# Patient Record
Sex: FEMALE | Race: WHITE | HISPANIC OR LATINO | Employment: PART TIME | URBAN - METROPOLITAN AREA
[De-identification: names, ages, dates, MRNs, and addresses within clinical notes are randomized per-mention and may not be internally consistent; named-entity substitution may affect disease eponyms.]

---

## 2017-01-07 ENCOUNTER — HOSPITAL ENCOUNTER (EMERGENCY)
Facility: MEDICAL CENTER | Age: 27
End: 2017-01-07
Attending: EMERGENCY MEDICINE
Payer: MEDICAID

## 2017-01-07 VITALS
DIASTOLIC BLOOD PRESSURE: 80 MMHG | BODY MASS INDEX: 22.2 KG/M2 | SYSTOLIC BLOOD PRESSURE: 115 MMHG | TEMPERATURE: 97.7 F | RESPIRATION RATE: 15 BRPM | WEIGHT: 130 LBS | HEIGHT: 64 IN | HEART RATE: 87 BPM

## 2017-01-07 DIAGNOSIS — F32.A DEPRESSION, UNSPECIFIED DEPRESSION TYPE: ICD-10-CM

## 2017-01-07 LAB
AMPHET UR QL SCN: NEGATIVE
BARBITURATES UR QL SCN: NEGATIVE
BENZODIAZ UR QL SCN: NEGATIVE
BZE UR QL SCN: NEGATIVE
CANNABINOIDS UR QL SCN: POSITIVE
HCG UR QL: NEGATIVE
MDMA UR QL SCN: NEGATIVE
METHADONE UR QL SCN: NEGATIVE
OPIATES UR QL SCN: NEGATIVE
OXYCODONE UR QL SCN: NEGATIVE
PCP UR QL SCN: NEGATIVE
POC BREATHALIZER: 0 PERCENT (ref 0–0.01)
PROPOXYPH UR QL SCN: NEGATIVE
SP GR UR REFRACTOMETRY: 1.01

## 2017-01-07 PROCEDURE — 81025 URINE PREGNANCY TEST: CPT

## 2017-01-07 PROCEDURE — 302970 POC BREATHALIZER: Performed by: EMERGENCY MEDICINE

## 2017-01-07 PROCEDURE — 90791 PSYCH DIAGNOSTIC EVALUATION: CPT

## 2017-01-07 PROCEDURE — 80307 DRUG TEST PRSMV CHEM ANLYZR: CPT

## 2017-01-07 PROCEDURE — 99284 EMERGENCY DEPT VISIT MOD MDM: CPT

## 2017-01-07 ASSESSMENT — LIFESTYLE VARIABLES
HAVE PEOPLE ANNOYED YOU BY CRITICIZING YOUR DRINKING: NO
CONSUMPTION TOTAL: NEGATIVE
ON A TYPICAL DAY WHEN YOU DRINK ALCOHOL HOW MANY DRINKS DO YOU HAVE: 0
TOTAL SCORE: 0
DO YOU DRINK ALCOHOL: YES
TOTAL SCORE: 0
HOW MANY TIMES IN THE PAST YEAR HAVE YOU HAD 5 OR MORE DRINKS IN A DAY: 0
EVER FELT BAD OR GUILTY ABOUT YOUR DRINKING: NO
TOTAL SCORE: 0
AVERAGE NUMBER OF DAYS PER WEEK YOU HAVE A DRINK CONTAINING ALCOHOL: 0
HAVE YOU EVER FELT YOU SHOULD CUT DOWN ON YOUR DRINKING: NO
EVER HAD A DRINK FIRST THING IN THE MORNING TO STEADY YOUR NERVES TO GET RID OF A HANGOVER: NO

## 2017-01-07 ASSESSMENT — PAIN SCALES - GENERAL
PAINLEVEL_OUTOF10: 1
PAINLEVEL_OUTOF10: 1

## 2017-01-07 NOTE — ED NOTES
Notified by lab unable to complete urine HCG secondary to specific gravity to low, request blood sample. MD notified. [facility] currently bedside speaking with pt

## 2017-01-07 NOTE — ED AVS SNAPSHOT
1/7/2017          Joy Hanson  [address] [place] [address]    Dear Joy:    [facility] wants to ensure your discharge home is safe and you or your loved ones have had all your questions answered regarding your care after you leave the hospital.    You may receive a telephone call within two days of your discharge.  This call is to make certain you understand your discharge instructions as well as ensure we provided you with the best care possible during your stay with us.     The call will only last approximately 3-5 minutes and will be done by a nurse.    Once again, we want to ensure your discharge home is safe and that you have a clear understanding of any next steps in your care.  If you have any questions or concerns, please do not hesitate to contact us, we are here for you.  Thank you for choosing [facility] for your healthcare needs.    Sincerely,    Bo Jarrett    [facility]
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
## 2017-01-07 NOTE — ED NOTES
Patient ambulatory to discharge with steady gait NAD or deficits noted at time of discharge boyfriend in [place]

## 2017-01-07 NOTE — ED NOTES
Brought in by EMS Hx depression and Sz for cutting self denies SI,  Police into see pt during triage

## 2017-01-07 NOTE — ED AVS SNAPSHOT
[employer] Access Code: Activation code not generated  Current [employer] Status: Active    [employer]  A secure, online tool to manage your health information     "[employer]’s [employer]® is a secure, online tool that connects you to your personalized health information from the privacy of your home -- day or night - making it very easy for you to manage your healthcare. Once the activation process is completed, you can even access your medical information using the [employer] bella, which is available for free in the Apple Bella store or Google Play store.     [employer] provides the following levels of access (as shown below):   My Chart Features   [facility] Primary Care Doctor [facility]  Specialists [facility]  Urgent  Care Non-[facility]  Primary Care  Doctor   Email your healthcare team securely and privately 24/7 X X X X   Manage appointments: schedule your next appointment; view details of past/upcoming appointments X      Request prescription refills. X      View recent personal medical records, including lab and immunizations X X X X   View health record, including health history, allergies, medications X X X X   Read reports about your outpatient visits, procedures, consult and ER notes X X X X   See your discharge summary, which is a recap of your hospital and/or ER visit that includes your diagnosis, lab results, and care plan. X X       How to register for [employer]:  1. Go to  [email or web address].  2. Click on the Sign Up Now box, which takes you to the New Member Sign Up page. You will need to provide the following information:  a. Enter your [employer] Access Code exactly as it appears at the top of this page. (You will not need to use this code after you’ve completed the sign-up process. If you do not sign up before the expiration date, you must request a new code.)   b. Enter your date of birth.   c. Enter your home email address.   d. Click Submit, and follow the next screen’s instructions.  3. Create a [employer] ID. This will  be your [employer] login ID and cannot be changed, so think of one that is secure and easy to remember.  4. Create a [employer] password. You can change your password at any time.  5. Enter your Password Reset Question and Answer. This can be used at a later time if you forget your password.   6. Enter your e-mail address. This allows you to receive e-mail notifications when new information is available in [employer].  7. Click Sign Up. You can now view your health information.    For assistance activating your [employer] account, call [phone]

## 2017-01-07 NOTE — DISCHARGE INSTRUCTIONS
Depression, Adult  Depression is feeling sad, low, down in the dumps, blue, gloomy, or empty. In general, there are two kinds of depression:  · Normal sadness or grief. This can happen after something upsetting. It often goes away on its own within 2 weeks. After losing a loved one (bereavement), normal sadness and grief may last longer than two weeks. It usually gets better with time.  · Clinical depression. This kind lasts longer than normal sadness or grief. It keeps you from doing the things you normally do in life. It is often hard to function at home, work, or at school. It may affect your relationships with others. Treatment is often needed.  GET HELP RIGHT AWAY IF:  · You have thoughts about hurting yourself or others.  · You lose touch with reality (psychotic symptoms). You may:  ¨ See or hear things that are not real.  ¨ Have untrue beliefs about your life or people around you.  · Your medicine is giving you problems.  MAKE SURE YOU:  · Understand these instructions.  · Will watch your condition.  · Will get help right away if you are not doing well or get worse.     This information is not intended to replace advice given to you by your health care provider. Make sure you discuss any questions you have with your health care provider.     Document Released: 01/20/2012 Document Revised: 01/08/2016 Document Reviewed: 04/18/2013  [employer] Interactive Patient Education ©2016 [employer]

## 2017-01-07 NOTE — ED NOTES
Patient's mother in lobby to visit.  Explained that due to the reason for her visit, she is not allowed visitors.  Promised her that I would put her information in the computer to call her if anything changes.  Ramona [phone]

## 2017-01-07 NOTE — ED AVS SNAPSHOT
After Visit Summary                                                                                                                Joy Hanson   MRN: [id number]    Department:  [facility], Emergency Dept   Date of Visit:  1/7/2017            [facility], Emergency Dept    [address]    [facility] [address]    Phone:  [phone]      You were seen by     Marcel Cabrera M.D.      Your Diagnosis Was     Depression, unspecified depression type     F32.9       Follow-up Information     1. Follow up with Ella Jiménez M.D. In 1 week.    Specialty:  Family Medicine    Contact information    [address]  [address]  [facility] [address] [phone]        Medication Information     Review all of your home medications and newly ordered medications with your primary doctor and/or pharmacist as soon as possible. Follow medication instructions as directed by your doctor and/or pharmacist.     Please keep your complete medication list with you and share with your physician. Update the information when medications are discontinued, doses are changed, or new medications (including over-the-counter products) are added; and carry medication information at all times in the event of emergency situations.               Medication List      ASK your doctor about these medications        Instructions    lamotrigine 150 MG tablet   Commonly known as:  LAMICTAL    Take 150 mg by mouth 2 times a day.   Dose:  150 mg               Procedures and tests performed during your visit     BETA-HCG QUALITATIVE URINE    CLOSE OBSERVATION    [facility] consult    POC BREATHALIZER    REFRACTOMETER SG    URINE DRUG SCREEN (TRIAGE)        Discharge Instructions       Depression, Adult  Depression is feeling sad, low, down in the dumps, blue, gloomy, or empty. In general, there are two kinds of depression:  · Normal sadness or grief. This can happen after something upsetting. It often goes away on its  own within 2 weeks. After losing a loved one (bereavement), normal sadness and grief may last longer than two weeks. It usually gets better with time.  · Clinical depression. This kind lasts longer than normal sadness or grief. It keeps you from doing the things you normally do in life. It is often hard to function at home, work, or at school. It may affect your relationships with others. Treatment is often needed.  GET HELP RIGHT AWAY IF:  · You have thoughts about hurting yourself or others.  · You lose touch with reality (psychotic symptoms). You may:  ¨ See or hear things that are not real.  ¨ Have untrue beliefs about your life or people around you.  · Your medicine is giving you problems.  MAKE SURE YOU:  · Understand these instructions.  · Will watch your condition.  · Will get help right away if you are not doing well or get worse.     This information is not intended to replace advice given to you by your health care provider. Make sure you discuss any questions you have with your health care provider.     Document Released: 01/20/2012 Document Revised: 01/08/2016 Document Reviewed: 04/18/2013  [employer] Interactive Patient Education ©2016 [employer]            Patient Information     Patient Information    Following emergency treatment: all patient requiring follow-up care must return either to a private physician or a clinic if your condition worsens before you are able to obtain further medical attention, please return to the emergency room.     Billing Information    At [facility], we work to make the billing process streamlined for our patients.  Our Representatives are here to answer any questions you may have regarding your hospital bill.  If you have insurance coverage and have supplied your insurance information to us, we will submit a claim to your insurer on your behalf.  Should you have any questions regarding your bill, we can be reached online or by phone as follows:  Online: You are able  pay your bills online or live chat with our representatives about any billing questions you may have. We are here to help Monday - Friday from 8:00am to 7:30pm and 9:00am - 12:00pm on Saturdays.  Please visit [email or web address]  for more information.   Phone:  [phone] or [phone]    Please note that your emergency physician, surgeon, pathologist, radiologist, anesthesiologist, and other specialists are not employed by [facility] and will therefore bill separately for their services.  Please contact them directly for any questions concerning their bills at the numbers below:     Emergency Physician Services:  [phone]  [facility]:  [phone]  [facility]:  [phone]  [facility]:  [phone]    1. Your final bill may vary from the amount quoted upon discharge if all procedures are not complete at that time, or if your doctor has additional procedures of which we are not aware. You will receive an additional bill if you return to the Emergency Department at [facility] for suture removal regardless of the facility of which the sutures were placed.     2. Please arrange for settlement of this account at the emergency registration.    3. All self-pay accounts are due in full at the time of treatment.  If you are unable to meet this obligation then payment is expected within 4-5 days.     4. If you have had radiology studies (CT, X-ray, Ultrasound, MRI), you have received a preliminary result during your emergency department visit. Please contact the radiology department [phone] to receive a copy of your final result. Please discuss the Final result with your primary physician or with the follow up physician provided.     Crisis Hotline:  [place] Crisis Hotline:  5-680-XXHHXAV or 1-759.345.4701  [place] Crisis Hotline:    1-867.392.8357 or 632-562-3462         ED Discharge Follow Up Questions    1. In order to  provide you with very good care, we would like to follow up with a phone call in the next few days.  May we have your permission to contact you?     YES /  NO    2. What is the best phone number to call you? (       )_____-__________    3. What is the best time to call you?      Morning  /  Afternoon  /  Evening                   Patient Signature:  ____________________________________________________________    Date:  ____________________________________________________________
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
## 2017-06-07 RX ADMIN — ACETAMINOPHEN 650 MG: 325 TABLET, FILM COATED ORAL at 22:40

## 2017-06-08 ENCOUNTER — APPOINTMENT (OUTPATIENT)
Dept: RADIOLOGY | Facility: MEDICAL CENTER | Age: 27
End: 2017-06-08
Attending: EMERGENCY MEDICINE
Payer: MEDICAID

## 2017-06-08 ENCOUNTER — HOSPITAL ENCOUNTER (EMERGENCY)
Facility: MEDICAL CENTER | Age: 27
End: 2017-06-09
Attending: EMERGENCY MEDICINE
Payer: MEDICAID

## 2017-06-08 ENCOUNTER — TELEPHONE (OUTPATIENT)
Dept: MEDICAL GROUP | Facility: PHYSICIAN GROUP | Age: 27
End: 2017-06-08

## 2017-06-08 DIAGNOSIS — G40.909 SEIZURE DISORDER (HCC): ICD-10-CM

## 2017-06-08 DIAGNOSIS — R45.851 SUICIDAL IDEATION: ICD-10-CM

## 2017-06-08 DIAGNOSIS — Z91.148 NONCOMPLIANCE WITH MEDICATION REGIMEN: ICD-10-CM

## 2017-06-08 DIAGNOSIS — R42 DIZZINESS: ICD-10-CM

## 2017-06-08 DIAGNOSIS — F12.90 MARIJUANA USE: ICD-10-CM

## 2017-06-08 LAB
ALBUMIN SERPL BCP-MCNC: 4.6 G/DL (ref 3.2–4.9)
ALBUMIN/GLOB SERPL: 1.9 G/DL
ALP SERPL-CCNC: 42 U/L (ref 30–99)
ALT SERPL-CCNC: 14 U/L (ref 2–50)
AMPHET UR QL SCN: NEGATIVE
ANION GAP SERPL CALC-SCNC: 9 MMOL/L (ref 0–11.9)
APPEARANCE UR: CLEAR
APPEARANCE UR: CLEAR
AST SERPL-CCNC: 19 U/L (ref 12–45)
BARBITURATES UR QL SCN: NEGATIVE
BASOPHILS # BLD AUTO: 0.5 % (ref 0–1.8)
BASOPHILS # BLD: 0.05 K/UL (ref 0–0.12)
BENZODIAZ UR QL SCN: NEGATIVE
BILIRUB SERPL-MCNC: 0.4 MG/DL (ref 0.1–1.5)
BILIRUB UR QL STRIP.AUTO: NEGATIVE
BNP SERPL-MCNC: 3 PG/ML (ref 0–100)
BUN SERPL-MCNC: 10 MG/DL (ref 8–22)
BZE UR QL SCN: NEGATIVE
CALCIUM SERPL-MCNC: 9.4 MG/DL (ref 8.5–10.5)
CANNABINOIDS UR QL SCN: POSITIVE
CHLORIDE SERPL-SCNC: 105 MMOL/L (ref 96–112)
CO2 SERPL-SCNC: 22 MMOL/L (ref 20–33)
COLOR UR AUTO: YELLOW
COLOR UR: COLORLESS
CREAT SERPL-MCNC: 0.43 MG/DL (ref 0.5–1.4)
CULTURE IF INDICATED INDCX: NO UA CULTURE
EOSINOPHIL # BLD AUTO: 0.11 K/UL (ref 0–0.51)
EOSINOPHIL NFR BLD: 1.2 % (ref 0–6.9)
ERYTHROCYTE [DISTWIDTH] IN BLOOD BY AUTOMATED COUNT: 39.2 FL (ref 35.9–50)
GFR SERPL CREATININE-BSD FRML MDRD: >60 ML/MIN/1.73 M 2
GLOBULIN SER CALC-MCNC: 2.4 G/DL (ref 1.9–3.5)
GLUCOSE SERPL-MCNC: 93 MG/DL (ref 65–99)
GLUCOSE UR QL STRIP.AUTO: NEGATIVE MG/DL
GLUCOSE UR STRIP.AUTO-MCNC: NEGATIVE MG/DL
HCG UR QL: NEGATIVE
HCT VFR BLD AUTO: 43.4 % (ref 37–47)
HGB BLD-MCNC: 14.8 G/DL (ref 12–16)
IMM GRANULOCYTES # BLD AUTO: 0.05 K/UL (ref 0–0.11)
IMM GRANULOCYTES NFR BLD AUTO: 0.5 % (ref 0–0.9)
KETONES UR QL STRIP.AUTO: NEGATIVE MG/DL
KETONES UR STRIP.AUTO-MCNC: NEGATIVE MG/DL
LACTATE BLD-SCNC: 1.3 MMOL/L (ref 0.5–2)
LEUKOCYTE ESTERASE UR QL STRIP.AUTO: NEGATIVE
LEUKOCYTE ESTERASE UR QL STRIP.AUTO: NEGATIVE
LIPASE SERPL-CCNC: 20 U/L (ref 11–82)
LYMPHOCYTES # BLD AUTO: 1.71 K/UL (ref 1–4.8)
LYMPHOCYTES NFR BLD: 18.6 % (ref 22–41)
MCH RBC QN AUTO: 28.9 PG (ref 27–33)
MCHC RBC AUTO-ENTMCNC: 34.1 G/DL (ref 33.6–35)
MCV RBC AUTO: 84.8 FL (ref 81.4–97.8)
MDMA UR QL SCN: NEGATIVE
METHADONE UR QL SCN: NEGATIVE
MICRO URNS: NORMAL
MONOCYTES # BLD AUTO: 0.59 K/UL (ref 0–0.85)
MONOCYTES NFR BLD AUTO: 6.4 % (ref 0–13.4)
NEUTROPHILS # BLD AUTO: 6.68 K/UL (ref 2–7.15)
NEUTROPHILS NFR BLD: 72.8 % (ref 44–72)
NITRITE UR QL STRIP.AUTO: NEGATIVE
NITRITE UR QL STRIP.AUTO: NEGATIVE
NRBC # BLD AUTO: 0 K/UL
NRBC BLD AUTO-RTO: 0 /100 WBC
OPIATES UR QL SCN: NEGATIVE
OXYCODONE UR QL SCN: NEGATIVE
PCP UR QL SCN: NEGATIVE
PH UR STRIP.AUTO: 7.5 [PH]
PH UR STRIP.AUTO: 8 [PH]
PLATELET # BLD AUTO: 206 K/UL (ref 164–446)
PMV BLD AUTO: 10.6 FL (ref 9–12.9)
POC BREATHALIZER: 0 PERCENT (ref 0–0.01)
POTASSIUM SERPL-SCNC: 3.5 MMOL/L (ref 3.6–5.5)
PROPOXYPH UR QL SCN: NEGATIVE
PROT SERPL-MCNC: 7 G/DL (ref 6–8.2)
PROT UR QL STRIP: NEGATIVE MG/DL
PROT UR QL STRIP: NEGATIVE MG/DL
RBC # BLD AUTO: 5.12 M/UL (ref 4.2–5.4)
RBC UR QL AUTO: ABNORMAL
RBC UR QL AUTO: NEGATIVE
SODIUM SERPL-SCNC: 136 MMOL/L (ref 135–145)
SP GR UR STRIP.AUTO: 1.01
SP GR UR: 1.02
TROPONIN I SERPL-MCNC: <0.01 NG/ML (ref 0–0.04)
WBC # BLD AUTO: 9.2 K/UL (ref 4.8–10.8)

## 2017-06-08 PROCEDURE — 302970 POC BREATHALIZER: Performed by: EMERGENCY MEDICINE

## 2017-06-08 PROCEDURE — 80053 COMPREHEN METABOLIC PANEL: CPT

## 2017-06-08 PROCEDURE — 700105 HCHG RX REV CODE 258: Performed by: EMERGENCY MEDICINE

## 2017-06-08 PROCEDURE — 90791 PSYCH DIAGNOSTIC EVALUATION: CPT

## 2017-06-08 PROCEDURE — 36415 COLL VENOUS BLD VENIPUNCTURE: CPT

## 2017-06-08 PROCEDURE — 85025 COMPLETE CBC W/AUTO DIFF WBC: CPT

## 2017-06-08 PROCEDURE — 81002 URINALYSIS NONAUTO W/O SCOPE: CPT | Mod: 59

## 2017-06-08 PROCEDURE — 84484 ASSAY OF TROPONIN QUANT: CPT

## 2017-06-08 PROCEDURE — 700102 HCHG RX REV CODE 250 W/ 637 OVERRIDE(OP): Performed by: EMERGENCY MEDICINE

## 2017-06-08 PROCEDURE — 80307 DRUG TEST PRSMV CHEM ANLYZR: CPT

## 2017-06-08 PROCEDURE — 81025 URINE PREGNANCY TEST: CPT

## 2017-06-08 PROCEDURE — 71010 DX-CHEST-PORTABLE (1 VIEW): CPT

## 2017-06-08 PROCEDURE — 81003 URINALYSIS AUTO W/O SCOPE: CPT | Mod: 59

## 2017-06-08 PROCEDURE — 83605 ASSAY OF LACTIC ACID: CPT

## 2017-06-08 PROCEDURE — 93005 ELECTROCARDIOGRAM TRACING: CPT | Performed by: EMERGENCY MEDICINE

## 2017-06-08 PROCEDURE — 83880 ASSAY OF NATRIURETIC PEPTIDE: CPT

## 2017-06-08 PROCEDURE — 96361 HYDRATE IV INFUSION ADD-ON: CPT

## 2017-06-08 PROCEDURE — 99285 EMERGENCY DEPT VISIT HI MDM: CPT

## 2017-06-08 PROCEDURE — 83690 ASSAY OF LIPASE: CPT

## 2017-06-08 PROCEDURE — 70450 CT HEAD/BRAIN W/O DYE: CPT

## 2017-06-08 PROCEDURE — 96360 HYDRATION IV INFUSION INIT: CPT

## 2017-06-08 PROCEDURE — A9270 NON-COVERED ITEM OR SERVICE: HCPCS | Performed by: EMERGENCY MEDICINE

## 2017-06-08 RX ORDER — SODIUM CHLORIDE 9 MG/ML
1000 INJECTION, SOLUTION INTRAVENOUS ONCE
Status: COMPLETED | OUTPATIENT
Start: 2017-06-08 | End: 2017-06-08

## 2017-06-08 RX ORDER — ACETAMINOPHEN 325 MG/1
650 TABLET ORAL ONCE
Status: COMPLETED | OUTPATIENT
Start: 2017-06-08 | End: 2017-06-07

## 2017-06-08 RX ORDER — LAMOTRIGINE 100 MG/1
150 TABLET ORAL ONCE
Status: DISCONTINUED | OUTPATIENT
Start: 2017-06-08 | End: 2017-06-09

## 2017-06-08 RX ORDER — MECLIZINE HYDROCHLORIDE 25 MG/1
25 TABLET ORAL ONCE
Status: COMPLETED | OUTPATIENT
Start: 2017-06-08 | End: 2017-06-08

## 2017-06-08 RX ORDER — LEVETIRACETAM 1000 MG/1
500 TABLET ORAL 2 TIMES DAILY
Qty: 60 TAB | Refills: 0 | Status: SHIPPED | OUTPATIENT
Start: 2017-06-08 | End: 2019-02-12 | Stop reason: CLARIF

## 2017-06-08 RX ORDER — LEVETIRACETAM 500 MG/1
1000 TABLET ORAL ONCE
Status: COMPLETED | OUTPATIENT
Start: 2017-06-08 | End: 2017-06-08

## 2017-06-08 RX ADMIN — LEVETIRACETAM 1000 MG: 500 TABLET, FILM COATED ORAL at 17:55

## 2017-06-08 RX ADMIN — MECLIZINE HYDROCHLORIDE 25 MG: 25 TABLET ORAL at 15:08

## 2017-06-08 RX ADMIN — SODIUM CHLORIDE 1000 ML: 9 INJECTION, SOLUTION INTRAVENOUS at 11:26

## 2017-06-08 ASSESSMENT — PAIN SCALES - GENERAL: PAINLEVEL_OUTOF10: 7

## 2017-06-08 ASSESSMENT — ENCOUNTER SYMPTOMS
ABDOMINAL PAIN: 0
BACK PAIN: 1
BLURRED VISION: 1
NECK PAIN: 1
FALLS: 1
MYALGIAS: 1
SEIZURES: 1
WEAKNESS: 1
DIZZINESS: 1
FEVER: 0
DEPRESSION: 1

## 2017-06-08 ASSESSMENT — LIFESTYLE VARIABLES: DO YOU DRINK ALCOHOL: NO

## 2017-06-08 NOTE — ED NOTES
Pt requesting to eat. Pt offered sandwich box, but refused stating her mom would go get her some soup. Pt informed we are waiting on [facility] to come see her for evaluation.

## 2017-06-08 NOTE — ED AVS SNAPSHOT
R[employer] Technology Access Code: Activation code not generated  Current Rive Technology Status: Active    [email or web address]  A secure, online tool to manage your health information     [employer]’s [employer] Technology® is a secure, online tool that connects you to your personalized health information from the privacy of your home -- day or night - making it very easy for you to manage your healthcare. Once the activation process is completed, you can even access your medical information using the R[employer] Technology bella, which is available for free in the Apple Bella store or Google Play store.     R[employer] Technology provides the following levels of access (as shown below):   My Chart Features   [facility] Primary Care Doctor [facility]  Specialists [facility]  Urgent  Care Non-[facility]  Primary Care  Doctor   Email your healthcare team securely and privately 24/7 X X X X   Manage appointments: schedule your next appointment; view details of past/upcoming appointments X      Request prescription refills. X      View recent personal medical records, including lab and immunizations X X X X   View health record, including health history, allergies, medications X X X X   Read reports about your outpatient visits, procedures, consult and ER notes X X X X   See your discharge summary, which is a recap of your hospital and/or ER visit that includes your diagnosis, lab results, and care plan. X X       How to register for R[employer] Technology:  1. Go to  [email or web address].  2. Click on the Sign Up Now box, which takes you to the New Member Sign Up page. You will need to provide the following information:  a. Enter your Rive Technology Access Code exactly as it appears at the top of this page. (You will not need to use this code after you’ve completed the sign-up process. If you do not sign up before the expiration date, you must request a new code.)   b. Enter your date of birth.   c. Enter your home email address.   d. Click Submit, and follow the next screen’s instructions.  3. Create a Rive Technology ID. This will  be your [employer] login ID and cannot be changed, so think of one that is secure and easy to remember.  4. Create a [employer] password. You can change your password at any time.  5. Enter your Password Reset Question and Answer. This can be used at a later time if you forget your password.   6. Enter your e-mail address. This allows you to receive e-mail notifications when new information is available in [employer].  7. Click Sign Up. You can now view your health information.    For assistance activating your [employer] account, call [phone]

## 2017-06-08 NOTE — ED NOTES
Assumed care of pt. Pt resting on gurney on monitor with family at bedside. Pt states she feels dizzy and has neck pain. Call light by pt.

## 2017-06-08 NOTE — ED NOTES
"MD notified that pt refused Lamictal stating, \"I took it on Saturday and it made me feel more depressed and dizzy.\"  "

## 2017-06-08 NOTE — ED NOTES
"Joy Hanson 26 y.o. female   Chief Complaint   Patient presents with   • Near Syncopal     Hx Sz disorder, stopped taking Sz meds 3 mos ago (researching diet/natural methods).  Sz 1.5 weeks ago and has been feeling as though sh's going to pass out since then.   • Dizziness     Severe. \"Bumping into things.\"  Fall x2 this week, hit head.  No midline cervical pain with palpation.       Pt returned to [facility] and educated on triage process.  Advised to notify RN with changes or concerns.    "

## 2017-06-08 NOTE — ED AVS SNAPSHOT
Home Care Instructions                                                                                                                Joy Hanson   MRN: [id number]    Department:  [facility], Emergency Dept   Date of Visit:  6/8/2017            [facility], Emergency Dept    [address] [address]    Phone:  [phone]      You were seen by     1. Houston Thomas M.D.    2. Abiodun Simeon M.D.    3. Darwin Heller M.D.    4. Win Tolentino M.D.      Your Diagnosis Was     Seizure disorder (CMS-[facility])     G40.909       These are the medications you received during your hospitalization from 06/08/2017 1029 to 06/09/2017 1215     Date/Time Order Dose Route Action    06/08/2017 1126 NS infusion 1,000 mL 1,000 mL Intravenous New Bag    06/08/2017 1515 lamotrigine (LAMICTAL) tablet 150 mg 150 mg Oral Refused    06/08/2017 1508 meclizine (ANTIVERT) tablet 25 mg 25 mg Oral Given    06/08/2017 1755 levetiracetam (KEPPRA) tablet 1,000 mg 1,000 mg Oral Given    06/09/2017 1057 levetiracetam (KEPPRA) tablet 500 mg 500 mg Oral Given      Follow-up Information     1. Follow up with Your Physician. Schedule an appointment as soon as possible for a visit in 2 days.    Specialty:  Emergency Medicine    Contact information    Varies          2. Follow up with Galileo Edwards M.D..    Specialty:  Neurology    Why:  or partner    Contact information    [address] [address] [phone]        Medication Information     Review all of your home medications and newly ordered medications with your primary doctor and/or pharmacist as soon as possible. Follow medication instructions as directed by your doctor and/or pharmacist.     Please keep your complete medication list with you and share with your physician. Update the information when medications are discontinued, doses are changed, or new medications (including over-the-counter products) are  added; and carry medication information at all times in the event of emergency situations.               Medication List      START taking these medications        Instructions    Morning Afternoon Evening Bedtime    levetiracetam 1000 MG tablet   Last time this was given:  500 mg on 6/9/2017 10:57 AM   Commonly known as:  KEPPRA        Take 0.5 Tabs by mouth 2 times a day.   Dose:  500 mg                             Where to Get Your Medications      You can get these medications from any pharmacy     Bring a paper prescription for each of these medications    - levetiracetam 1000 MG tablet            Procedures and tests performed during your visit     BTYPE NATRIURETIC PEPTIDE    CARDIAC MONITORING    CBC WITH DIFFERENTIAL    COMP METABOLIC PANEL    CT-HEAD W/O    DX-CHEST-PORTABLE (1 VIEW)    EKG (NOW)    ESTIMATED GFR    IP CONSULT TO     IV SALINE LOCK    LACTIC ACID    LIPASE    NURSING COMMUNICATION    POC BREATHALIZER    POC UA    POC URINE PREGNANCY    TROPONIN    URINALYSIS CULTURE, IF INDICATED    URINE DRUG SCREEN        Discharge Instructions       Follow-up with your doctor.  Return to the ER for any new medical problems or complaints.  For now, I recommend you see your neurologist in [place].  Going forward, if you want a neurologist in [place], call to make appt with [facility].    Seizure, Adult  A seizure is abnormal electrical activity in the brain. Seizures usually last from 30 seconds to 2 minutes. There are various types of seizures.  Before a seizure, you may have a warning sensation (aura) that a seizure is about to occur. An aura may include the following symptoms:   · Fear or anxiety.  · Nausea.  · Feeling like the room is spinning (vertigo).  · Vision changes, such as seeing flashing lights or spots.  Common symptoms during a seizure include:  · A change in attention or behavior (altered mental status).  · Convulsions with rhythmic jerking movements.  · Drooling.  · Rapid  eye movements.  · Grunting.  · Loss of bladder and bowel control.  · Bitter taste in the mouth.  · Tongue biting.  After a seizure, you may feel confused and sleepy. You may also have an injury resulting from convulsions during the seizure.  HOME CARE INSTRUCTIONS   · If you are given medicines, take them exactly as prescribed by your health care provider.  · Keep all follow-up appointments as directed by your health care provider.  · Do not swim or drive or engage in risky activity during which a seizure could cause further injury to you or others until your health care provider says it is OK.  · Get adequate rest.  · Teach friends and family what to do if you have a seizure. They should:  ¨ Lay you on the ground to prevent a fall.  ¨ Put a cushion under your head.  ¨ Loosen any tight clothing around your neck.  ¨ Turn you on your side. If vomiting occurs, this helps keep your airway clear.  ¨ Stay with you until you recover.  ¨ Know whether or not you need emergency care.  SEEK IMMEDIATE MEDICAL CARE IF:  · The seizure lasts longer than 5 minutes.  · The seizure is severe or you do not wake up immediately after the seizure.  · You have an altered mental status after the seizure.  · You are having more frequent or worsening seizures.  Someone should drive you to the emergency department or call local emergency services (911 in [place]  MAKE SURE YOU:  · Understand these instructions.  · Will watch your condition.  · Will get help right away if you are not doing well or get worse.     This information is not intended to replace advice given to you by your health care provider. Make sure you discuss any questions you have with your health care provider.     Document Released: 12/15/2001 Document Revised: 01/08/2016 Document Reviewed: 07/30/2014  Elsevier Interactive Patient Education ©2016 [employer]            Patient Information     Patient Information    Following emergency treatment: all patient requiring  follow-up care must return either to a private physician or a clinic if your condition worsens before you are able to obtain further medical attention, please return to the emergency room.     Billing Information    At [facility], we work to make the billing process streamlined for our patients.  Our Representatives are here to answer any questions you may have regarding your hospital bill.  If you have insurance coverage and have supplied your insurance information to us, we will submit a claim to your insurer on your behalf.  Should you have any questions regarding your bill, we can be reached online or by phone as follows:  Online: You are able pay your bills online or live chat with our representatives about any billing questions you may have. We are here to help Monday - Friday from 8:00am to 7:30pm and 9:00am - 12:00pm on Saturdays.  Please visit [email or web address]  for more information.   Phone:  [phone] or [phone]    Please note that your emergency physician, surgeon, pathologist, radiologist, anesthesiologist, and other specialists are not employed by [facility] and will therefore bill separately for their services.  Please contact them directly for any questions concerning their bills at the numbers below:     Emergency Physician Services:  [phone]  [facility]:  [phone]  [facility]:  [phone]  [facility]:  [phone]    1. Your final bill may vary from the amount quoted upon discharge if all procedures are not complete at that time, or if your doctor has additional procedures of which we are not aware. You will receive an additional bill if you return to the Emergency Department at [facility] for suture removal regardless of the facility of which the sutures were placed.     2. Please arrange for settlement of this account at the emergency registration.    3. All self-pay accounts are due in  full at the time of treatment.  If you are unable to meet this obligation then payment is expected within 4-5 days.     4. If you have had radiology studies (CT, X-ray, Ultrasound, MRI), you have received a preliminary result during your emergency department visit. Please contact the radiology department [phone] to receive a copy of your final result. Please discuss the Final result with your primary physician or with the follow up physician provided.     Crisis Hotline:  [place] Crisis Hotline:  1-738-CJKZSHZ or 1-253.562.9417  [place] Crisis Hotline:    1-376.408.6252 or 601-255-3175         ED Discharge Follow Up Questions    1. In order to provide you with very good care, we would like to follow up with a phone call in the next few days.  May we have your permission to contact you?     YES /  NO    2. What is the best phone number to call you? (       )_____-__________    3. What is the best time to call you?      Morning  /  Afternoon  /  Evening                   Patient Signature:  ____________________________________________________________    Date:  ____________________________________________________________

## 2017-06-08 NOTE — ED AVS SNAPSHOT
6/9/2017    Joy Hanson  [address] [place] [address]    Dear Joy:    [facility] wants to ensure your discharge home is safe and you or your loved ones have had all of your questions answered regarding your care after you leave the hospital.    Below is a list of resources and contact information should you have any questions regarding your hospital stay, follow-up instructions, or active medical symptoms.    Questions or Concerns Regarding… Contact   Medical Questions Related to Your Discharge  (7 days a week, 8am-5pm) Contact a Nurse Care Coordinator   [phone]   Medical Questions Not Related to Your Discharge  (24 hours a day / 7 days a week)  Contact the Nurse Health Line   [phone]    Medications or Discharge Instructions Refer to your discharge packet   or contact your [facility] Primary Care Provider   [phone]   Follow-up Appointment(s) Schedule your appointment via [employer]   or contact Scheduling [phone]   Billing Review your statement via [employer]  or contact Billing [phone]   Medical Records Review your records via [employer]   or contact Medical Records [phone]     You may receive a telephone call within two days of discharge. This call is to make certain you understand your discharge instructions and have the opportunity to have any questions answered. You can also easily access your medical information, test results and upcoming appointments via the [employer] free online health management tool. You can learn more and sign up at [employer]. For assistance setting up your [employer] account, please call [phone].    Once again, we want to ensure your discharge home is safe and that you have a clear understanding of any next steps in your care. If you have any questions or concerns, please do not hesitate to contact us, we are here for you. Thank you for choosing [facility] for your healthcare needs.    Sincerely,    Your [facility] Healthcare Team

## 2017-06-08 NOTE — ED PROVIDER NOTES
"ED Provider Note    Scribed for Dr. Houston Thomas M.D. by Horace Mccray. 6/8/2017, 10:45 AM.    Primary care provider: Ella Jiménez M.D.  Means of arrival: Ambulance  History obtained from: Patient  History limited by: None    CHIEF COMPLAINT  Chief Complaint   Patient presents with   • Near Syncopal     Hx Sz disorder, stopped taking Sz meds 3 mos ago (researching diet/natural methods).  Sz 1.5 weeks ago and has been feeling as though sh's going to pass out since then.   • Dizziness     Severe. \"Bumping into things.\"  Fall x2 this week, hit head.  No midline cervical pain with palpation.        HPI  Joy Hanson is a 26 y.o. female who presents to the Emergency Department brought in by ambulance due to \"intense\" myalgias. Per patient, she had a seizure last Monday and woke up on the floor with a laceration to her lip. She reports hitting her head at that time and obtained a bump to her head. The patient states that she still has a bump to her head, as well as continued head pain. She also has neck pain radiating to her back. Her back pain is diffuse and worse in the middle. She states that her back feels \"tense\". She has had associated symptoms of blurred vision, dizziness, and weakness. The patient reports that she went to the bathroom today and could barely get off the toilet secondary to feeling weak and dizzy.  Per patient, she has been \"bumping into things\" in her house since she feels \"disoriented and unstable\". She also states that she has been shaking all week and \"unable to control [her] body. However, the patient has not had any falls this week after her seizure. She also reports \"hot flashes\" but denies fever, difficulties urinating, or abdominal pain. The patient has a history of seizures and reports taking Lamictal Saturday. However, it did not improve her symptoms and it made her depressed and suicidal. Per patient, she stopped taking Lamictal since she was denied Medicaid for 3 to 4 months. " "Denies taking any other medications. She tried changing her diet, doing yoga, and doing aromatherapy, but this did not help her symptoms. The patient has a history of depression and reports previous suicidal attempts. She normally smokes marijuana to help with her depression and appetite. However, marijuana has made her feel more dizzy this week. Denies suicidal ideation currently. The patient denies a chance of being pregnant. Her neurologist is Dr. Soler.      REVIEW OF SYSTEMS  Review of Systems   Constitutional: Negative for fever.        Positive for shaking and \"hot flashes\".    HENT:        Positive for head pain/ bump to head.    Eyes: Positive for blurred vision.   Gastrointestinal: Negative for abdominal pain.   Genitourinary:        Negative for difficulties urinating.    Musculoskeletal: Positive for myalgias, back pain (diffuse, worst in middle. ), falls (No falls since seizure) and neck pain.   Neurological: Positive for dizziness, seizures and weakness.        Positive for feeling \"disoriented and unstable\". Positive for being \"unable to control body\".    Psychiatric/Behavioral: Positive for depression and suicidal ideas (resolved).   All other systems reviewed and are negative.  C    PAST MEDICAL HISTORY   has a past medical history of Seizure disorder (CMS-HCC); Seizure (CMS-HCC); and Psychiatric disorder (depression).    SURGICAL HISTORY  No surgical history noted    SOCIAL HISTORY  Social History   Substance Use Topics   • Smoking status: Never Smoker    • Alcohol Use: Yes      History   Drug Use No     Comment: thc       FAMILY HISTORY  No family history noted    CURRENT MEDICATIONS  Home Medications     Reviewed by Daija Salcedo R.N. (Registered Nurse) on 06/08/17 at 1040  Med List Status: Complete    Medication Last Dose Status          Patient [place] Taking any Medications                        ALLERGIES  No Known Allergies    PHYSICAL EXAM  VITAL SIGNS: /63 mmHg  Pulse 72  " "Temp(Src) 36.2 °C (97.2 °F)  Resp 16  Ht 1.626 m (5' 4.02\")  Wt 58.968 kg (130 lb)  BMI 22.30 kg/m2  SpO2 99%  LMP 05/24/2017 (Exact Date)  Vitals reviewed.  Constitutional: Well developed, Well nourished, no acute distress, Non-toxic appearance.   HENT: Normocephalic, Atraumatic, Bilateral external ears normal, Oropharynx moist, No oral exudates, Nose normal. TM are normal  Eyes: PERRL, EOMI, Conjunctiva normal, No discharge.   Neck: Normal range of motion,.   Cardiovascular: Normal heart rate, Normal rhythm, No murmurs, No rubs, No gallops.   Thorax & Lungs: Normal breath sounds, No respiratory distress, No wheezing,   Abdomen: Bowel sounds normal, Soft, No tenderness  Skin: Warm, Dry, No erythema, No rash.   Back: No focal tenderness, No CVA tenderness.   Musculoskeletal: Good range of motion in all major joints.   Neurologic: Alert, Normal motor function, Normal sensory function, No focal deficits noted.   Psychiatric: Anxious , depressed, states she is suicidal.    LABS  Results for orders placed or performed during the hospital encounter of 06/08/17   CBC WITH DIFFERENTIAL   Result Value Ref Range    WBC 9.2 4.8 - 10.8 K/uL    RBC 5.12 4.20 - 5.40 M/uL    Hemoglobin 14.8 12.0 - 16.0 g/dL    Hematocrit 43.4 37.0 - 47.0 %    MCV 84.8 81.4 - 97.8 fL    MCH 28.9 27.0 - 33.0 pg    MCHC 34.1 33.6 - 35.0 g/dL    RDW 39.2 35.9 - 50.0 fL    Platelet Count 206 164 - 446 K/uL    MPV 10.6 9.0 - 12.9 fL    Neutrophils-Polys 72.80 (H) 44.00 - 72.00 %    Lymphocytes 18.60 (L) 22.00 - 41.00 %    Monocytes 6.40 0.00 - 13.40 %    Eosinophils 1.20 0.00 - 6.90 %    Basophils 0.50 0.00 - 1.80 %    Immature Granulocytes 0.50 0.00 - 0.90 %    Nucleated RBC 0.00 /100 WBC    Neutrophils (Absolute) 6.68 2.00 - 7.15 K/uL    Lymphs (Absolute) 1.71 1.00 - 4.80 K/uL    Monos (Absolute) 0.59 0.00 - 0.85 K/uL    Eos (Absolute) 0.11 0.00 - 0.51 K/uL    Baso (Absolute) 0.05 0.00 - 0.12 K/uL    Immature Granulocytes (abs) 0.05 0.00 - 0.11 " K/uL    NRBC (Absolute) 0.00 K/uL   COMP METABOLIC PANEL   Result Value Ref Range    Sodium 136 135 - 145 mmol/L    Potassium 3.5 (L) 3.6 - 5.5 mmol/L    Chloride 105 96 - 112 mmol/L    Co2 22 20 - 33 mmol/L    Anion Gap 9.0 0.0 - 11.9    Glucose 93 65 - 99 mg/dL    Bun 10 8 - 22 mg/dL    Creatinine 0.43 (L) 0.50 - 1.40 mg/dL    Calcium 9.4 8.5 - 10.5 mg/dL    AST(SGOT) 19 12 - 45 U/L    ALT(SGPT) 14 2 - 50 U/L    Alkaline Phosphatase 42 30 - 99 U/L    Total Bilirubin 0.4 0.1 - 1.5 mg/dL    Albumin 4.6 3.2 - 4.9 g/dL    Total Protein 7.0 6.0 - 8.2 g/dL    Globulin 2.4 1.9 - 3.5 g/dL    A-G Ratio 1.9 g/dL   LIPASE   Result Value Ref Range    Lipase 20 11 - 82 U/L   TROPONIN   Result Value Ref Range    Troponin I <0.01 0.00 - 0.04 ng/mL   BTYPE NATRIURETIC PEPTIDE   Result Value Ref Range    B Natriuretic Peptide 3 0 - 100 pg/mL   LACTIC ACID   Result Value Ref Range    Lactic Acid 1.3 0.5 - 2.0 mmol/L   ESTIMATED GFR   Result Value Ref Range    GFR If African American >60 >60 mL/min/1.73 m 2    GFR If Non African American >60 >60 mL/min/1.73 m 2   POC UA   Result Value Ref Range    POC Color Yellow     POC Appearance Clear     POC Glucose Negative Negative mg/dL    POC Ketones Negative Negative mg/dL    POC Specific Gravity 1.020 1.005-1.030    POC Blood Trace-intact (A) Negative    POC Urine PH 7.5 5.0-8.0    POC Protein Negative Negative mg/dL    POC Nitrites Negative Negative    POC Leukocyte Esterase Negative Negative   POC URINE PREGNANCY   Result Value Ref Range    POC Urine Pregnancy Test Negative Negative   EKG (NOW)   Result Value Ref Range    Report       [facility] Emergency Dept.    Test Date:  2017  Pt Name:    ANAYA CELESTE               Department: ER  MRN:        [id number]                      Room:       [facility]  Gender:     F                            Technician: [id number]  :        1990                   Requested By:AUSTIN COELLO  Order #:    [id number]                 "    Reading MD:    Measurements  Intervals                                Axis  Rate:       61                           P:          47  LA:         136                          QRS:        63  QRSD:       72                           T:          42  QT:         408  QTc:        411    Interpretive Statements  SINUS RHYTHM  No previous ECG available for comparison       All labs reviewed by me.    EKG Interpretation  Interpreted by me    Rhythm:  Normal sinus rhythm   Rate: 61  Axis: normal  Ectopy: none  Conduction: normal  ST Segments: no acute change  T Waves: no acute change  Q Waves: none  Clinical Impression: Normal EKG without acute changes      RADIOLOGY  CT-HEAD W/O   Final Result      No acute intracranial abnormality is identified.      DX-CHEST-PORTABLE (1 VIEW)   Final Result      No acute cardiac or pulmonary abnormalities are identified.        The radiologist's interpretation of all radiological studies have been reviewed by me.    COURSE & MEDICAL DECISION MAKING  Pertinent Labs & Imaging studies reviewed. (See chart for details)    Obtained and reviewed past medical records from previous visits for seizures and noncompliance.    10:45 AM Patient seen and examined at bedside. The patient presents with *depression and anxiety as well as seizures, dizziness., and the differential diagnosis includes but is not limited to seizure, electrolyte abnormality, dehydration, and anxiety. Ordered for EKG, estimated GFR, BNP, troponin, lipase, CMP, CBC with differential, POC urine pregnancy, POC urinalysis, urinalysis culture, lactic acid, CT head, and chest x-ray to evaluate. Patient will be treated with NS infusion 1,000 mL IV for rehydration.     11:27 AM- Ordered POC Urine pregnancy and POC UA to further evaluate symptoms.     12:25 PM- Reviewed labs and radiological studies, which are overall unremarkable.     3:48 PM- Per nurse, the patient is refusing Lamictal, stating that she \"took it on Saturday and it " "made [her] feel more depressed and dizzy\".     4:21 PM- [facility] nurse will evaluate patient.     5:01 PM- Per [facility] nurse, the patient is feeling suicidal and will be put on a hol    5:06 PM- Paged Dr. Soler (Neurology).     The patient presents here with multiple complaints.  She is depressed, tearful, states she is suicidal and depressed.  Once medically cleared.  Otherwise, he'll see her.    She is noncompliant.  She complains of dizziness, feeling unsteady, falling down, having seizures and hitting her head.  Head CT and metabolic workup and significant workup performed and are all negative.  She complains of persistent dizziness which is a normal neurologic exam and a normal stable gait.  She walks on the ER.  I further through different maneuvers to try to reproduce her vertigo and this was unsuccessful.  The patient does not have reproducible Vertigo.  She has a normal cerebellar exam.  I doubt this is a posterior cerebellar stroke.  I don't think she requires an MRI.  She was of unsteadiness on her feet, but she looks well and she walks without any difficulty.    She is noncompliant with her seizure medications because she states it makes her feel more depressed and suicidal.  This is not likely to be the case.  However, the patient is counseled to take seizure medications.  She agreed to take Keppra again.  Did not want IV Keppra so we will start  her back on oral Keppra.      At this point, I think she'll be transferred for inpatient psychiatric care.  She was only able hold.  She is currently stable.    I'll write a prescription for Her.  She is to follow-up with her doctor.  She is to return to the ER for any new medical problems or complaints.  Counseled to take her medications.  And to stop doing drugs.    I did speak with Dr. Goins, who recommended starting Keppra.  She'll not take Lamictal.  I was unable to get ahold of the patient's neurologist.    DISPOSITION:  Patient will be discharged " home in stable condition.    FOLLOW UP:  No follow-up provider specified.    OUTPATIENT MEDICATIONS:  New Prescriptions    No medications on file         FINAL IMPRESSION  1. Seizure disorder (HCC)    2. Noncompliance with medication regimen    3. Marijuana use    4. Dizziness    5. Suicidal ideation          Horace MANCILLA (Scribe), am scribing for, and in the presence of, Houston Thomas M.D..    Electronically signed by: Horace Mccray (Scribe), 6/8/2017    IHouston M.D. personally performed the services described in this documentation, as scribed by Horace Mccray in my presence, and it is both accurate and complete.    The note accurately reflects work and decisions made by me.  Houston Thomas  6/8/2017  6:52 PM

## 2017-06-09 VITALS
RESPIRATION RATE: 16 BRPM | BODY MASS INDEX: 22.2 KG/M2 | HEART RATE: 78 BPM | SYSTOLIC BLOOD PRESSURE: 107 MMHG | TEMPERATURE: 97.2 F | WEIGHT: 130 LBS | HEIGHT: 64 IN | OXYGEN SATURATION: 98 % | DIASTOLIC BLOOD PRESSURE: 65 MMHG

## 2017-06-09 PROCEDURE — A9270 NON-COVERED ITEM OR SERVICE: HCPCS | Performed by: EMERGENCY MEDICINE

## 2017-06-09 PROCEDURE — 700102 HCHG RX REV CODE 250 W/ 637 OVERRIDE(OP): Performed by: EMERGENCY MEDICINE

## 2017-06-09 RX ORDER — LEVETIRACETAM 500 MG/1
500 TABLET ORAL 2 TIMES DAILY
Status: DISCONTINUED | OUTPATIENT
Start: 2017-06-09 | End: 2017-06-09 | Stop reason: HOSPADM

## 2017-06-09 RX ADMIN — LEVETIRACETAM 500 MG: 500 TABLET, FILM COATED ORAL at 10:57

## 2017-06-09 ASSESSMENT — PAIN SCALES - GENERAL: PAINLEVEL_OUTOF10: 0

## 2017-06-09 NOTE — ED NOTES
Provided patient with discharge instructions and prescriptions. Patient verbalized understanding of follow up and home care. Patient verbalized understanding to call [facility] if she does not hear from them on Monday.
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
## 2017-06-09 NOTE — DISCHARGE PLANNING
MSW received call from Rich from [facility] who [facility]

## 2017-06-09 NOTE — ED NOTES
Assumed care of patient. Patient resting on cart with eyes closed. Chest rise and fall equal, non labored. Sitter in direct observation of patient.

## 2017-06-09 NOTE — ED NOTES
Pt placed on Legal 2000 by [facility] RN. Paperwork completed and placed in chart for MD to complete MD portion.

## 2017-06-09 NOTE — ED NOTES
Med Rec completed per patient at bedside. No medications per patient  Allergies reviewed and updated  No antibiotics within the last 30 days.

## 2017-06-09 NOTE — ED NOTES
Pt sleeping with visible rise and fall of chest, sitter outside room    Ordered her a hospital bed earlier still waiting on delivery

## 2017-06-09 NOTE — ED NOTES
Provided patient with word search, dot to dot, coloring pages, and crayons. Provided patient with hot breakfast tray.

## 2017-06-09 NOTE — DISCHARGE PLANNING
provided with face sheet and hold and will fax to appropriate hospitals.  [facility] was notified and the person answering said to just fax the face sheet and legal hold.....done.

## 2017-06-09 NOTE — PROGRESS NOTES
Patient's home medications have been reviewed by the pharmacy team.     Patient's Medications   New Prescriptions    LEVETIRACETAM (KEPPRA) 1000 MG TABLET    Take 0.5 Tabs by mouth 2 times a day.   Previous Medications    No medications on file   Modified Medications    No medications on file   Discontinued Medications    LAMOTRIGINE (LAMICTAL) 150 MG TABLET    Take 150 mg by mouth 2 times a day.         A:  Denies taking meds at home. Stopped taking Lamictal since she was denied Medicaid for 3 to 4 months    P:    No recommendations at this time. Appears to be refusing seizure meds. Await neuro/psych recommendations.     Josemanuel Nelson, PharmD, BCPS      Addendum: discussed with Dr. Rajan aquino Keppra for seizures. Patient agrees to take, question compliance as outpt.

## 2017-06-09 NOTE — ED PROVIDER NOTES
"ED Provider Note    No issues overnight. Her initial provider would like her to be on Keppra, will talk with the pharmacist about making this happen./63 mmHg  Pulse 79  Temp(Src) 36.2 °C (97.2 °F)  Resp 12  Ht 1.626 m (5' 4.02\")  Wt 58.968 kg (130 lb)  BMI 22.30 kg/m2  SpO2 97%  LMP 05/24/2017 (Exact Date)  Presently Dr. Preciado is seeing the patient. Disposition per her. She is recommended discharged from a psychiatric standpoint and has discontinued her hold. The patient has already had a prescription written for Keppra. She is encouraged to take this. It sounds if there is difficulty following up with her neurologist in [place] due to distance. I recommended not changing her doctors at this point in time, however she may establish with a neurologist here in [place] at [facility] going forward if this will be more convenient.      "

## 2017-06-09 NOTE — TELEPHONE ENCOUNTER
Please help patient with hospital follow up appointment with me in next 2-3 weeks.  Ella Jiménez M.D.

## 2017-06-09 NOTE — DISCHARGE INSTRUCTIONS
Follow-up with your doctor.  Return to the ER for any new medical problems or complaints.  For now, I recommend you see your neurologist in [place].  Going forward, if you want a neurologist in [place], call to make appt with [facility].    Seizure, Adult  A seizure is abnormal electrical activity in the brain. Seizures usually last from 30 seconds to 2 minutes. There are various types of seizures.  Before a seizure, you may have a warning sensation (aura) that a seizure is about to occur. An aura may include the following symptoms:   · Fear or anxiety.  · Nausea.  · Feeling like the room is spinning (vertigo).  · Vision changes, such as seeing flashing lights or spots.  Common symptoms during a seizure include:  · A change in attention or behavior (altered mental status).  · Convulsions with rhythmic jerking movements.  · Drooling.  · Rapid eye movements.  · Grunting.  · Loss of bladder and bowel control.  · Bitter taste in the mouth.  · Tongue biting.  After a seizure, you may feel confused and sleepy. You may also have an injury resulting from convulsions during the seizure.  HOME CARE INSTRUCTIONS   · If you are given medicines, take them exactly as prescribed by your health care provider.  · Keep all follow-up appointments as directed by your health care provider.  · Do not swim or drive or engage in risky activity during which a seizure could cause further injury to you or others until your health care provider says it is OK.  · Get adequate rest.  · Teach friends and family what to do if you have a seizure. They should:  ¨ Lay you on the ground to prevent a fall.  ¨ Put a cushion under your head.  ¨ Loosen any tight clothing around your neck.  ¨ Turn you on your side. If vomiting occurs, this helps keep your airway clear.  ¨ Stay with you until you recover.  ¨ Know whether or not you need emergency care.  SEEK IMMEDIATE MEDICAL CARE IF:  · The seizure lasts longer than 5 minutes.  · The seizure is severe or  you do not wake up immediately after the seizure.  · You have an altered mental status after the seizure.  · You are having more frequent or worsening seizures.  Someone should drive you to the emergency department or call local emergency services (911 in [place]  MAKE SURE YOU:  · Understand these instructions.  · Will watch your condition.  · Will get help right away if you are not doing well or get worse.     This information is not intended to replace advice given to you by your health care provider. Make sure you discuss any questions you have with your health care provider.     Document Released: 12/15/2001 Document Revised: 01/08/2016 Document Reviewed: 07/30/2014  Elsevier Interactive Patient Education ©2016 [employer]

## 2017-06-09 NOTE — DISCHARGE PLANNING
Medical Social Work    Referral: Legal Hold    Intervention: Legal Hold Paperwork given to SW by [employer] RN: Gita Weathers    Legal Hold Initiated: Date: 06/08/2017  Time: 1655    Legal Hold faxed: Date: 06/09/2017  Time: 0103    Patient’s Insurance Listed on Face Sheet: [employer]    Referrals sent to: [facility] and [facility]    Plan: Patient will transfer to mental health facility once acceptance is obtained.
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
## 2017-06-15 LAB — EKG IMPRESSION: NORMAL

## 2018-11-12 ENCOUNTER — INITIAL PRENATAL (OUTPATIENT)
Dept: OBGYN | Facility: CLINIC | Age: 28
End: 2018-11-12
Payer: MEDICAID

## 2018-11-12 VITALS — DIASTOLIC BLOOD PRESSURE: 58 MMHG | BODY MASS INDEX: 23.5 KG/M2 | SYSTOLIC BLOOD PRESSURE: 100 MMHG | WEIGHT: 137 LBS

## 2018-11-12 DIAGNOSIS — Z32.01 PREGNANCY EXAMINATION OR TEST, POSITIVE RESULT: ICD-10-CM

## 2018-11-12 DIAGNOSIS — G40.909 SEIZURE DISORDER (HCC): ICD-10-CM

## 2018-11-12 DIAGNOSIS — N91.2 AMENORRHEA: ICD-10-CM

## 2018-11-12 LAB
IN CLINIC OB SCAN: NORMAL
INT CON NEG: NEGATIVE
INT CON POS: POSITIVE
POC URINE PREGNANCY TEST: POSITIVE

## 2018-11-12 PROCEDURE — 76830 TRANSVAGINAL US NON-OB: CPT | Performed by: OBSTETRICS & GYNECOLOGY

## 2018-11-12 PROCEDURE — 99203 OFFICE O/P NEW LOW 30 MIN: CPT | Mod: 25 | Performed by: OBSTETRICS & GYNECOLOGY

## 2018-11-12 PROCEDURE — 81025 URINE PREGNANCY TEST: CPT | Performed by: OBSTETRICS & GYNECOLOGY

## 2018-11-12 NOTE — PROGRESS NOTES
Pt here today for   LMP: 09/27/2018  WT: 100/58  BP: 100/58  Pt state she has a little bit of nausea and a little bit of trouble sleeping. States no other concerns.  Good # [phone]

## 2018-11-12 NOTE — PROGRESS NOTES
CC= confirmation of pregnancy    History of present illness:   28 y.o.  6 weeks and 4 days by sure LMP presents for confirmation of pregnancy  Doing well  No cramping, no VB  Some nausea    Review of systems:  Pertinent positives documented in HPI and all other systems reviewed & are negative    All PMH, PSH, allergies, social history and FH reviewed and updated today:  Past Medical History:   Diagnosis Date   • Psychiatric disorder depression   • Seizure (HCC)    • Seizure disorder (HCC)     Dx as a child. on Keppra 250 mg once a day.   • Substance abuse (HCC)     marijuana use 3 x a week       History reviewed. No pertinent surgical history.    Allergies:   Allergies   Allergen Reactions   • Pistachio Swelling     swelling       Social History     Social History   • Marital status: Single     Spouse name: N/A   • Number of children: N/A   • Years of education: N/A     Occupational History   • Not on file.     Social History Main Topics   • Smoking status: Never Smoker   • Smokeless tobacco: Never Used   • Alcohol use Yes   • Drug use: Yes     Types: Marijuana      Comment: pt states uses marijuana 3 times a week to help with nausea    • Sexual activity: Yes     Partners: Male      Comment: Unplanned pregnancy. pt states was not on birth control.     Other Topics Concern   • Not on file     Social History Narrative    ** Merged History Encounter **            History reviewed. No pertinent family history.    Physical exam:  Blood pressure 100/58, weight 62.1 kg (137 lb), last menstrual period 2018, not currently breastfeeding.    General:appears stated age, is in no apparent distress, is well developed and well nourished  Head: normocephalic, non-tender  Abdomen: Bowel sounds positive, nondistended, soft, nontender x4, no rebound or guarding. No organomegaly. No masses.  Skin: No rashes, or ulcers or lesions seen  Psychiatric: Patient shows appropriate affect, is alert and oriented x3, intact judgment and  insight.      1. Pregnancy examination or test, positive result  POCT Pregnancy    Prenatal MV-Min-Fe Fum-FA-DHA (PRENATAL 1 PO)    POCT IN CLINIC OB SCAN    REFERRAL TO PERINATOLOGY   2. Seizure disorder (HCC)  POCT Pregnancy    Prenatal MV-Min-Fe Fum-FA-DHA (PRENATAL 1 PO)    POCT IN CLINIC OB SCAN    REFERRAL TO PERINATOLOGY   3. Amenorrhea  POCT Pregnancy    Prenatal MV-Min-Fe Fum-FA-DHA (PRENATAL 1 PO)    POCT IN CLINIC OB SCAN    REFERRAL TO PERINATOLOGY   3. TVS shows an early live IUP 6 weeks and 3 days by CRL (+) cardiac activity YS seen. ARTUR 7/5/2019. normal cervix, normal ovaries   4. Hydrate  5. PNV with FA - 0.4 to 1 mg  6. New OB visit 4 weeks

## 2018-11-13 NOTE — PROGRESS NOTES
Referral faxed to Dr. GARCIA on 11/13/18.  They will contact patient to schedule appt.  Please check with patient if appt was made/ document. Thank you

## 2019-01-28 ENCOUNTER — OFFICE VISIT (OUTPATIENT)
Dept: NEUROLOGY | Facility: MEDICAL CENTER | Age: 29
End: 2019-01-28
Payer: MEDICAID

## 2019-01-28 VITALS
OXYGEN SATURATION: 96 % | BODY MASS INDEX: 25.06 KG/M2 | DIASTOLIC BLOOD PRESSURE: 60 MMHG | HEIGHT: 64 IN | SYSTOLIC BLOOD PRESSURE: 102 MMHG | WEIGHT: 146.8 LBS | HEART RATE: 90 BPM | TEMPERATURE: 97.2 F

## 2019-01-28 DIAGNOSIS — G40.909 SEIZURE DISORDER (HCC): ICD-10-CM

## 2019-01-28 PROCEDURE — 99204 OFFICE O/P NEW MOD 45 MIN: CPT | Performed by: NURSE PRACTITIONER

## 2019-01-28 RX ORDER — LORAZEPAM 1 MG/1
TABLET ORAL
Qty: 20 TAB | Refills: 0 | Status: SHIPPED | OUTPATIENT
Start: 2019-01-28 | End: 2019-02-28

## 2019-01-28 ASSESSMENT — ENCOUNTER SYMPTOMS
DIARRHEA: 0
SEIZURES: 0
NERVOUS/ANXIOUS: 0
COUGH: 0
SORE THROAT: 0
VOMITING: 0
NAUSEA: 0
DEPRESSION: 0
DOUBLE VISION: 0
CONSTITUTIONAL NEGATIVE: 1
MUSCULOSKELETAL NEGATIVE: 1
ABDOMINAL PAIN: 0
HEADACHES: 0

## 2019-01-28 ASSESSMENT — PATIENT HEALTH QUESTIONNAIRE - PHQ9: CLINICAL INTERPRETATION OF PHQ2 SCORE: 0

## 2019-01-28 NOTE — PROGRESS NOTES
"Subjective:      Joy Hanson is a 28 y.o. female who presents with [facility] (Epilepsy )            HPI  Last seen per Dr Soler early .    History of seizures since early childhood, about age 2-3 years old.  They persisted until age 5.    When age 18, she was \"going through depression and stress\" which caused the seizures to return.    Typically always a GTC seizure with bladder loss.  Recalls that she was always \"heavily medicated.\".      Seizures almost always occur when sleeping (between 6-9am), right before she starts bleeding, and when her stress level is high.    Tried and failed: Lamotrigine?    Currently taking Keppra 250mg BID-- since her \"seizures stopped\".  She'd like to challenge a lower dose.  Does not like taking pills in general.    Last known seizures was 2018.  About 6 months ago she did awaken with her tongue being bit and felt poorly.    G1-P0  at 7 weeks.    Did have a 's license for a short time.  Has driven in the later hours of the day without a license.    Education: GED in 11th grade.  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    Medical history: mental health-- history of cutting.  Surgical history none    Lives in [place], [place] with boyfriend most often.    Trialed: Depakote, Dilantin, Lamictal  LEV 250mg BID.    No current birth control methods.  Did use the NuvaRing-- the 2nd month she couldn't refill.    2016: INTERPRETATION:  This EEG is not remarkable.      Of note, unremarkable EEG does not completely exclude the diagnosis of seizures since seizure is an episodic phenomena.  Clinical correlation may help     If clinical suspicion of seizure remains high.  Prolonged outpatient EEG monitoring may be of help.    2017: Head CT, normal.  : Brain MRI, normal.    Stopped consuming alcohol.  None  Marijuana history  Current Outpatient Prescriptions   Medication Sig Dispense Refill   • levetiracetam (KEPPRA) " "1000 MG tablet Take 0.5 Tabs by mouth 2 times a day. (Patient taking differently: Take 250 mg by mouth Once.) 60 Tab 0   • Prenatal MV-Min-Fe Fum-FA-DHA (PRENATAL 1 PO) Take  by mouth.       No current facility-administered medications for this visit.        Review of Systems   Constitutional: Negative.    HENT: Negative for hearing loss, nosebleeds and sore throat.         No recent head injury.   Eyes: Negative for double vision.        No new loss of vision.   Respiratory: Negative for cough.         No recent lung infections.   Cardiovascular: Negative for chest pain.   Gastrointestinal: Negative for abdominal pain, diarrhea, nausea and vomiting.   Genitourinary: Negative.    Musculoskeletal: Negative.    Skin: Negative.    Neurological: Negative for seizures and headaches.   Endo/Heme/Allergies:        No history of endocrine dysfunction.  No new problems.   Psychiatric/Behavioral: Negative for depression. The patient is not nervous/anxious.         No recent mood changes.          Objective:     /60   Pulse 90   Temp 36.2 °C (97.2 °F) (Temporal)   Ht 1.626 m (5' 4\")   Wt 66.6 kg (146 lb 12.8 oz)   LMP 09/27/2018   SpO2 96%   Breastfeeding? Unknown   BMI 25.20 kg/m²      Physical Exam   Constitutional: She is oriented to person, place, and time. She appears well-developed and well-nourished. No distress.   HENT:   Head: Normocephalic and atraumatic.   Nose: Nose normal.   Eyes: Pupils are equal, round, and reactive to light.   Wears glasses all the time.   Neck: Normal range of motion.   Cardiovascular: Normal rate and regular rhythm.  Exam reveals no gallop and no friction rub.    No murmur heard.  Pulmonary/Chest: Effort normal and breath sounds normal. No respiratory distress.   Musculoskeletal: Normal range of motion.   Lymphadenopathy:     She has no cervical adenopathy.   Neurological: She is alert and oriented to person, place, and time. Gait normal.   Naturally right-handed, pleasant " conversant.  CN II: Fundi normal, visual fields full to confrontation.  CN III, IV, VI: Pupils equal, round, and reactive to light.  Extraocular movements full and intact in horizontal and vertical gaze.  CN V: Normal in motor and sensory modalities.  CN VII: No evidence of facial asymmetry.  CN VIII: Hearing grossly intact.  CN IX, X: Palate elevates symmetrically and in the midline.  CN XI: Normal sternocleidomastoid strength.  CN XII: Tongue is in the midline.    Motor: Normal muscle bulk and tone, with full and symmetric strength.  Sensory: Intact to light touch, pinprick, vibration, proprioception, and graphesthesia.  DTR's: 2+ throughout with flexor plantar responses.  Cerebellar/Coordination: Normal finger to finger, finger-tapping, rapid alternating movements, and foot tapping.  Gait: Normal casual gait.  Walks well on heels and toes, as well as in tandem gait.   Skin: Skin is warm and dry.   Psychiatric: She has a normal mood and affect.             Assessment/Plan:     Seizure disorder:  History of events suggestive of seizures since age 1.   Most notably, for the past 10 years she has been experiencing spells suggestive of generalization including bladder loss.  Nocturnal based and often shortly before menstruation starts.    Last concern for a seizure was 2018-- she awoke with tongue bite and post-ictal.    Has tried and failed: Depakote, Dilantin, Lamictal.    Does not wish to continue LEV (Keppra generic) 250mg BID.    Significant mood disorder history:  History of cutting.  Not established with mental health currently.   at 7 weeks just 2 months ago, G1-P0.    Neurological examination is normal and non-focal.    Discussed history of suspected seizures at length.  She presents as high risk for a mixed type including seizures and non-epileptic events/conversion reaction disorder.    Discussed medication management.  She is taking such a low dose of Keppra at this time that she more than  likely is safe to simply stop it.  She would like to await an EEG before discussing AED options.  I prefer and educate that I would like to convert her onto a different AED, namely considering Lamictal     Obtain REEG to evaluate for subclinical seizures.    New Rx for ativan 1mg tablets provided.    Lengthy conversation regarding family planning and ideally to avoid a menstrual cycle if possible due to the significant hormonal coorrelation.    Discussed general safety-- she is not cleared to drive at this time.  We will further discuss with the goal of driving in the future.  Reviewed cooking and water safety.    To call with any concern for seizures-- if such occurs before EEG, I would again recommend starting an AED to aid in mood stabilization as the EEG will be supporting evidence only.    Return for follow-up after EEG or sooner if needed.      EDUCATION AND COUNSELING:  -Education was provided to the patient and/or family regarding diagnosis and prognosis. The chronic and unpredictable nature of the condition was discussed. There is increased risk for additional events, which may carry potential for significant injuries and death.    -We reviewed the current antiepileptic regimen. Potential side effects of antiepileptics were discussed at length, including but no limited to: hypersensitivity reactions (rash and others, some of which can be fatal), visual field changes (some of which may be irreversible), glaucoma, diplopia, kidney stones, osteopenia/osteoporosis/bone fractures, hyperthermia/anhydrosis, tremors, ataxia, dizziness, fatigue, increased risk for falls, cardiac arrhythmias/syncope, gastrointestinal (hepatitis, pancreatitis, gastritis, ulcers), gingival hypertrophy, drowsiness, sedation, anxiety/nervousness, increased risk for suicide, increased risk for depression, and psychosis. We reviewed drug-drug interactions and their potential effect on seizure control and medication side effects.     -Patient/family educated on SUDEP (Sudden Death in Epilepsy). Counseling was provided on the importance of strict medication and follow up compliance. The patient understands the risks associated with non-adherence with the medical plan as outlined, including but not limited to an increased risk for breakthrough seizures, which may contribute to injuries, disability, status epilepticus, and even death.    -Counseling was also provided on potential effects of alcohol and other drugs, which may lower seizure threshold and/or affect the metabolism of antiepileptic drugs. I recommend avoidance of alcohol and illegal drugs.  -Recommend proper hydration, regular exercise, proper sleep hygiene (7-8 hrs of overnight sleep, avoid sleep deprivation).    -I have made the patient aware of mandatory reporting required by the law in the State of [place] regarding episodes of seizures, loss of consciousness, and/or alteration of awareness. The patient and family are responsible for reporting events to the DMV, instructions were provided. The patient verbalized understanding and states she has been driving but is advised to not. Other seizure precautions were discussed at length, including no diving, no skydiving, no unsupervised swimming, no Jacuzzi or bathing in bathtubs.    -She is ok to work but cannot operate any heavy machinery, he verbalized understanding.     Pt agrees with plan.

## 2019-01-28 NOTE — PATIENT INSTRUCTIONS
1) Based on history of seizures, you are more than likely going to continue to have them.    2) Keppra dosing is very low and more than likely not protecting from having a seizure at this time.    3) We will call to schedule an EEG and need an appointment after the EEG to discuss results.    4) Recommend a birth control that completely stops a period from happening.

## 2019-02-12 ENCOUNTER — APPOINTMENT (OUTPATIENT)
Dept: RADIOLOGY | Facility: MEDICAL CENTER | Age: 29
End: 2019-02-12
Attending: EMERGENCY MEDICINE
Payer: MEDICAID

## 2019-02-12 ENCOUNTER — HOSPITAL ENCOUNTER (OUTPATIENT)
Facility: MEDICAL CENTER | Age: 29
End: 2019-02-13
Attending: EMERGENCY MEDICINE | Admitting: HOSPITALIST
Payer: MEDICAID

## 2019-02-12 DIAGNOSIS — G40.909 RECURRENT SEIZURES (HCC): Primary | ICD-10-CM

## 2019-02-12 DIAGNOSIS — S09.93XA INJURY OF TONGUE, INITIAL ENCOUNTER: ICD-10-CM

## 2019-02-12 LAB
ALBUMIN SERPL BCP-MCNC: 4.2 G/DL (ref 3.2–4.9)
ALBUMIN/GLOB SERPL: 1.8 G/DL
ALP SERPL-CCNC: 38 U/L (ref 30–99)
ALT SERPL-CCNC: 16 U/L (ref 2–50)
ANION GAP SERPL CALC-SCNC: 7 MMOL/L (ref 0–11.9)
AST SERPL-CCNC: 24 U/L (ref 12–45)
BASOPHILS # BLD AUTO: 0.7 % (ref 0–1.8)
BASOPHILS # BLD: 0.06 K/UL (ref 0–0.12)
BILIRUB SERPL-MCNC: 0.4 MG/DL (ref 0.1–1.5)
BUN SERPL-MCNC: 8 MG/DL (ref 8–22)
CALCIUM SERPL-MCNC: 8.5 MG/DL (ref 8.5–10.5)
CHLORIDE SERPL-SCNC: 110 MMOL/L (ref 96–112)
CK SERPL-CCNC: 93 U/L (ref 0–154)
CO2 SERPL-SCNC: 20 MMOL/L (ref 20–33)
CREAT SERPL-MCNC: 0.57 MG/DL (ref 0.5–1.4)
EOSINOPHIL # BLD AUTO: 0.14 K/UL (ref 0–0.51)
EOSINOPHIL NFR BLD: 1.6 % (ref 0–6.9)
ERYTHROCYTE [DISTWIDTH] IN BLOOD BY AUTOMATED COUNT: 40.3 FL (ref 35.9–50)
GLOBULIN SER CALC-MCNC: 2.4 G/DL (ref 1.9–3.5)
GLUCOSE SERPL-MCNC: 97 MG/DL (ref 65–99)
HCG SERPL QL: NEGATIVE
HCT VFR BLD AUTO: 41.4 % (ref 37–47)
HGB BLD-MCNC: 13.8 G/DL (ref 12–16)
IMM GRANULOCYTES # BLD AUTO: 0.04 K/UL (ref 0–0.11)
IMM GRANULOCYTES NFR BLD AUTO: 0.4 % (ref 0–0.9)
LYMPHOCYTES # BLD AUTO: 1.42 K/UL (ref 1–4.8)
LYMPHOCYTES NFR BLD: 15.8 % (ref 22–41)
MAGNESIUM SERPL-MCNC: 1.9 MG/DL (ref 1.5–2.5)
MCH RBC QN AUTO: 29.4 PG (ref 27–33)
MCHC RBC AUTO-ENTMCNC: 33.3 G/DL (ref 33.6–35)
MCV RBC AUTO: 88.1 FL (ref 81.4–97.8)
MONOCYTES # BLD AUTO: 0.75 K/UL (ref 0–0.85)
MONOCYTES NFR BLD AUTO: 8.4 % (ref 0–13.4)
NEUTROPHILS # BLD AUTO: 6.57 K/UL (ref 2–7.15)
NEUTROPHILS NFR BLD: 73.1 % (ref 44–72)
NRBC # BLD AUTO: 0 K/UL
NRBC BLD-RTO: 0 /100 WBC
PLATELET # BLD AUTO: 188 K/UL (ref 164–446)
PMV BLD AUTO: 10.6 FL (ref 9–12.9)
POTASSIUM SERPL-SCNC: 4.1 MMOL/L (ref 3.6–5.5)
PROT SERPL-MCNC: 6.6 G/DL (ref 6–8.2)
RBC # BLD AUTO: 4.7 M/UL (ref 4.2–5.4)
SODIUM SERPL-SCNC: 137 MMOL/L (ref 135–145)
TSH SERPL DL<=0.005 MIU/L-ACNC: 1.97 UIU/ML (ref 0.38–5.33)
WBC # BLD AUTO: 9 K/UL (ref 4.8–10.8)

## 2019-02-12 PROCEDURE — 700111 HCHG RX REV CODE 636 W/ 250 OVERRIDE (IP): Performed by: EMERGENCY MEDICINE

## 2019-02-12 PROCEDURE — A9270 NON-COVERED ITEM OR SERVICE: HCPCS | Performed by: HOSPITALIST

## 2019-02-12 PROCEDURE — G0378 HOSPITAL OBSERVATION PER HR: HCPCS

## 2019-02-12 PROCEDURE — 96376 TX/PRO/DX INJ SAME DRUG ADON: CPT

## 2019-02-12 PROCEDURE — 700111 HCHG RX REV CODE 636 W/ 250 OVERRIDE (IP): Performed by: HOSPITALIST

## 2019-02-12 PROCEDURE — 700102 HCHG RX REV CODE 250 W/ 637 OVERRIDE(OP): Performed by: HOSPITALIST

## 2019-02-12 PROCEDURE — 700105 HCHG RX REV CODE 258: Performed by: EMERGENCY MEDICINE

## 2019-02-12 PROCEDURE — 96374 THER/PROPH/DIAG INJ IV PUSH: CPT

## 2019-02-12 PROCEDURE — 96372 THER/PROPH/DIAG INJ SC/IM: CPT

## 2019-02-12 PROCEDURE — 99285 EMERGENCY DEPT VISIT HI MDM: CPT

## 2019-02-12 PROCEDURE — 84443 ASSAY THYROID STIM HORMONE: CPT

## 2019-02-12 PROCEDURE — 96375 TX/PRO/DX INJ NEW DRUG ADDON: CPT

## 2019-02-12 PROCEDURE — 82550 ASSAY OF CK (CPK): CPT

## 2019-02-12 PROCEDURE — 304561 HCHG STAT O2

## 2019-02-12 PROCEDURE — 99220 PR INITIAL OBSERVATION CARE,LEVL III: CPT | Performed by: HOSPITALIST

## 2019-02-12 PROCEDURE — 99215 OFFICE O/P EST HI 40 MIN: CPT | Performed by: PSYCHIATRY & NEUROLOGY

## 2019-02-12 PROCEDURE — 700111 HCHG RX REV CODE 636 W/ 250 OVERRIDE (IP)

## 2019-02-12 PROCEDURE — 85025 COMPLETE CBC W/AUTO DIFF WBC: CPT

## 2019-02-12 PROCEDURE — 84703 CHORIONIC GONADOTROPIN ASSAY: CPT

## 2019-02-12 PROCEDURE — 83735 ASSAY OF MAGNESIUM: CPT

## 2019-02-12 PROCEDURE — 70450 CT HEAD/BRAIN W/O DYE: CPT

## 2019-02-12 PROCEDURE — 80053 COMPREHEN METABOLIC PANEL: CPT

## 2019-02-12 RX ORDER — ONDANSETRON 2 MG/ML
4 INJECTION INTRAMUSCULAR; INTRAVENOUS EVERY 4 HOURS PRN
Status: DISCONTINUED | OUTPATIENT
Start: 2019-02-12 | End: 2019-02-13 | Stop reason: HOSPADM

## 2019-02-12 RX ORDER — SODIUM CHLORIDE 9 MG/ML
1000 INJECTION, SOLUTION INTRAVENOUS CONTINUOUS
Status: DISCONTINUED | OUTPATIENT
Start: 2019-02-12 | End: 2019-02-12

## 2019-02-12 RX ORDER — PROMETHAZINE HYDROCHLORIDE 25 MG/1
12.5-25 SUPPOSITORY RECTAL EVERY 4 HOURS PRN
Status: DISCONTINUED | OUTPATIENT
Start: 2019-02-12 | End: 2019-02-13 | Stop reason: HOSPADM

## 2019-02-12 RX ORDER — AMOXICILLIN 250 MG
2 CAPSULE ORAL 2 TIMES DAILY
Status: DISCONTINUED | OUTPATIENT
Start: 2019-02-12 | End: 2019-02-13 | Stop reason: HOSPADM

## 2019-02-12 RX ORDER — PROMETHAZINE HYDROCHLORIDE 25 MG/1
12.5-25 TABLET ORAL EVERY 4 HOURS PRN
Status: DISCONTINUED | OUTPATIENT
Start: 2019-02-12 | End: 2019-02-13 | Stop reason: HOSPADM

## 2019-02-12 RX ORDER — LORAZEPAM 2 MG/ML
2 INJECTION INTRAMUSCULAR ONCE
Status: COMPLETED | OUTPATIENT
Start: 2019-02-12 | End: 2019-02-12

## 2019-02-12 RX ORDER — KETOROLAC TROMETHAMINE 30 MG/ML
30 INJECTION, SOLUTION INTRAMUSCULAR; INTRAVENOUS ONCE
Status: COMPLETED | OUTPATIENT
Start: 2019-02-12 | End: 2019-02-12

## 2019-02-12 RX ORDER — ACETAMINOPHEN 325 MG/1
650 TABLET ORAL EVERY 6 HOURS PRN
Status: DISCONTINUED | OUTPATIENT
Start: 2019-02-12 | End: 2019-02-13 | Stop reason: HOSPADM

## 2019-02-12 RX ORDER — BISACODYL 10 MG
10 SUPPOSITORY, RECTAL RECTAL
Status: DISCONTINUED | OUTPATIENT
Start: 2019-02-12 | End: 2019-02-13 | Stop reason: HOSPADM

## 2019-02-12 RX ORDER — POLYETHYLENE GLYCOL 3350 17 G/17G
1 POWDER, FOR SOLUTION ORAL
Status: DISCONTINUED | OUTPATIENT
Start: 2019-02-12 | End: 2019-02-13 | Stop reason: HOSPADM

## 2019-02-12 RX ORDER — LORAZEPAM 2 MG/ML
INJECTION INTRAMUSCULAR
Status: COMPLETED
Start: 2019-02-12 | End: 2019-02-12

## 2019-02-12 RX ORDER — LEVETIRACETAM 500 MG/1
500 TABLET ORAL
Status: ON HOLD | COMMUNITY
End: 2019-02-13

## 2019-02-12 RX ORDER — LEVETIRACETAM 500 MG/1
500 TABLET ORAL 2 TIMES DAILY
Status: DISCONTINUED | OUTPATIENT
Start: 2019-02-12 | End: 2019-02-13 | Stop reason: HOSPADM

## 2019-02-12 RX ORDER — ONDANSETRON 4 MG/1
4 TABLET, ORALLY DISINTEGRATING ORAL EVERY 4 HOURS PRN
Status: DISCONTINUED | OUTPATIENT
Start: 2019-02-12 | End: 2019-02-13 | Stop reason: HOSPADM

## 2019-02-12 RX ORDER — LORAZEPAM 2 MG/ML
4 INJECTION INTRAMUSCULAR
Status: DISCONTINUED | OUTPATIENT
Start: 2019-02-12 | End: 2019-02-13 | Stop reason: HOSPADM

## 2019-02-12 RX ADMIN — FENTANYL CITRATE 50 MCG: 50 INJECTION, SOLUTION INTRAMUSCULAR; INTRAVENOUS at 11:49

## 2019-02-12 RX ADMIN — SODIUM CHLORIDE 1000 MG: 9 INJECTION, SOLUTION INTRAVENOUS at 12:00

## 2019-02-12 RX ADMIN — SODIUM CHLORIDE 500 MG: 9 INJECTION, SOLUTION INTRAVENOUS at 11:05

## 2019-02-12 RX ADMIN — ENOXAPARIN SODIUM 40 MG: 100 INJECTION SUBCUTANEOUS at 16:01

## 2019-02-12 RX ADMIN — SODIUM CHLORIDE 1000 ML: 9 INJECTION, SOLUTION INTRAVENOUS at 11:53

## 2019-02-12 RX ADMIN — ACETAMINOPHEN 650 MG: 325 TABLET, FILM COATED ORAL at 16:02

## 2019-02-12 RX ADMIN — LORAZEPAM 2 MG: 2 INJECTION INTRAMUSCULAR; INTRAVENOUS at 11:04

## 2019-02-12 RX ADMIN — KETOROLAC TROMETHAMINE 30 MG: 30 INJECTION INTRAMUSCULAR; INTRAVENOUS at 11:59

## 2019-02-12 RX ADMIN — ONDANSETRON 4 MG: 4 TABLET, ORALLY DISINTEGRATING ORAL at 20:37

## 2019-02-12 RX ADMIN — LEVETIRACETAM 500 MG: 500 TABLET ORAL at 16:02

## 2019-02-12 RX ADMIN — LORAZEPAM 2 MG: 2 INJECTION INTRAMUSCULAR at 11:04

## 2019-02-12 ASSESSMENT — COGNITIVE AND FUNCTIONAL STATUS - GENERAL
MOBILITY SCORE: 24
DAILY ACTIVITIY SCORE: 24
SUGGESTED CMS G CODE MODIFIER MOBILITY: CH
SUGGESTED CMS G CODE MODIFIER DAILY ACTIVITY: CH

## 2019-02-12 ASSESSMENT — PATIENT HEALTH QUESTIONNAIRE - PHQ9
1. LITTLE INTEREST OR PLEASURE IN DOING THINGS: NOT AT ALL
2. FEELING DOWN, DEPRESSED, IRRITABLE, OR HOPELESS: NOT AT ALL
SUM OF ALL RESPONSES TO PHQ9 QUESTIONS 1 AND 2: 0

## 2019-02-12 ASSESSMENT — COPD QUESTIONNAIRES
HAVE YOU SMOKED AT LEAST 100 CIGARETTES IN YOUR ENTIRE LIFE: NO/DON'T KNOW
DO YOU EVER COUGH UP ANY MUCUS OR PHLEGM?: NO/ONLY WITH OCCASIONAL COLDS OR INFECTIONS
DURING THE PAST 4 WEEKS HOW MUCH DID YOU FEEL SHORT OF BREATH: NONE/LITTLE OF THE TIME
IN THE PAST 12 MONTHS DO YOU DO LESS THAN YOU USED TO BECAUSE OF YOUR BREATHING PROBLEMS: DISAGREE/UNSURE

## 2019-02-12 ASSESSMENT — LIFESTYLE VARIABLES: EVER_SMOKED: NEVER

## 2019-02-12 NOTE — ED NOTES
Med Rec completed per family at bedside (medication bottles were returned to family at bedside in sealed bag)  Allergies reviewed  No ORAL antibiotics in last 30 days    Patient was D/C'd from [facility] 2 weeks ago and has Ativan but hasn't taken yet

## 2019-02-12 NOTE — ED NOTES
Patient transported to imaging via gurney and RN pt tolerated well she can follow commands pt reports feeling tired she is back to room

## 2019-02-12 NOTE — ED PROVIDER NOTES
ED Provider Note    CHIEF COMPLAINT  Chief Complaint   Patient presents with   • Seizure     X3       HPI  Joy Hanson is a 28 y.o. female who presents to the emergency department by ambulance following seizure.  Patient with history of seizure disorder, although states she has had not had seizure in 1 year, was previously taking 250 mg of Keppra daily until reevaluated by neurology a couple of weeks ago who discontinued her medications entirely.  Patient had 3 seizures prior to arrival, 4 AM, 7 AM and again just prior to arrival after EMS was called to the house for the second time.  Significant other witnessed seizures, patient was in bed for each of the seizures, no fall or trauma.  He suggest seizures lasting up to 3 minutes followed by postictal period and then returned to baseline.  Patient complains of some tongue pain, body aches and mild headache.  No vomiting.  No fever.  No recent illness or travel.  Denies pregnancy.    REVIEW OF SYSTEMS  See HPI for further details. All other systems are negative.     PAST MEDICAL HISTORY   has a past medical history of Psychiatric disorder (depression); Seizure (HCC); Seizure disorder (HCC); and Substance abuse (HCC).    SOCIAL HISTORY  Social History     Social History Main Topics   • Smoking status: Never Smoker   • Smokeless tobacco: Never Used   • Alcohol use Yes   • Drug use: Yes     Types: Marijuana      Comment: pt states uses marijuana 3 times a week to help with nausea    • Sexual activity: Yes     Partners: Male      Comment: Unplanned pregnancy. pt states was not on birth control.       SURGICAL HISTORY  patient denies any surgical history    CURRENT MEDICATIONS  Home Medications     Reviewed by Talisha Paez (Pharmacy Tech) on 02/12/19 at 1126  Med List Status: Complete   Medication Last Dose Status   levETIRAcetam (KEPPRA) 500 MG Tab 2 WEEKS Active   LORazepam (ATIVAN) 1 MG Tab PRN Active                ALLERGIES  Allergies   Allergen  "Reactions   • Pistachio Swelling     swelling       PHYSICAL EXAM  VITAL SIGNS: BP (!) 95/66   Pulse (!) 135   Temp 36.7 °C (98 °F) (Temporal)   Resp 14   Ht 1.626 m (5' 4\")   Wt 67 kg (147 lb 11.3 oz)   LMP 01/24/2019   SpO2 100%   BMI 25.35 kg/m²   Pulse ox interpretation: I interpret this pulse ox as normal.  Constitutional: Alert in no apparent distress.  HENT: Normocephalic, atraumatic. Bilateral external ears normal, Nose normal. Moist mucous membranes.  Bilateral superficial tongue contusions without hematoma or laceration.  No active bleeding.  Eyes: Pupils are equal and reactive, Conjunctiva normal.  EOMI.  No nystagmus.  Neck: No midline tenderness or step-off.  Bilateral paravertebral discomfort.  Full range of motion without resistance.  No meningeal irritation.  Lymphatic: No lymphadenopathy noted.   Cardiovascular: Regular rate and rhythm, no murmurs. Distal pulses intact.    Thorax & Lungs: Normal breath sounds.  No wheezing/rales/ronchi. No increased work of breathing  Abdomen: Soft, non-distended, non-tender to palpation.   Skin: Warm, Dry, No erythema, No rash.   Musculoskeletal: Good range of motion in all major joints. No major deformities noted.   Neurologic: Alert and oriented x4.  Speech is slow but cohesive and without slur.  Moves 4 extremities spontaneously.  5 out of 5 strength in 4 extremities.  Psychiatric: Flat affect otherwise cooperative.    DIAGNOSTIC STUDIES / PROCEDURES    LABS  Results for orders placed or performed during the hospital encounter of 02/12/19   CBC WITH DIFFERENTIAL   Result Value Ref Range    WBC 9.0 4.8 - 10.8 K/uL    RBC 4.70 4.20 - 5.40 M/uL    Hemoglobin 13.8 12.0 - 16.0 g/dL    Hematocrit 41.4 37.0 - 47.0 %    MCV 88.1 81.4 - 97.8 fL    MCH 29.4 27.0 - 33.0 pg    MCHC 33.3 (L) 33.6 - 35.0 g/dL    RDW 40.3 35.9 - 50.0 fL    Platelet Count 188 164 - 446 K/uL    MPV 10.6 9.0 - 12.9 fL    Neutrophils-Polys 73.10 (H) 44.00 - 72.00 %    Lymphocytes 15.80 (L) " 22.00 - 41.00 %    Monocytes 8.40 0.00 - 13.40 %    Eosinophils 1.60 0.00 - 6.90 %    Basophils 0.70 0.00 - 1.80 %    Immature Granulocytes 0.40 0.00 - 0.90 %    Nucleated RBC 0.00 /100 WBC    Neutrophils (Absolute) 6.57 2.00 - 7.15 K/uL    Lymphs (Absolute) 1.42 1.00 - 4.80 K/uL    Monos (Absolute) 0.75 0.00 - 0.85 K/uL    Eos (Absolute) 0.14 0.00 - 0.51 K/uL    Baso (Absolute) 0.06 0.00 - 0.12 K/uL    Immature Granulocytes (abs) 0.04 0.00 - 0.11 K/uL    NRBC (Absolute) 0.00 K/uL   COMP METABOLIC PANEL   Result Value Ref Range    Sodium 137 135 - 145 mmol/L    Potassium 4.1 3.6 - 5.5 mmol/L    Chloride 110 96 - 112 mmol/L    Co2 20 20 - 33 mmol/L    Anion Gap 7.0 0.0 - 11.9    Glucose 97 65 - 99 mg/dL    Bun 8 8 - 22 mg/dL    Creatinine 0.57 0.50 - 1.40 mg/dL    Calcium 8.5 8.5 - 10.5 mg/dL    AST(SGOT) 24 12 - 45 U/L    ALT(SGPT) 16 2 - 50 U/L    Alkaline Phosphatase 38 30 - 99 U/L    Total Bilirubin 0.4 0.1 - 1.5 mg/dL    Albumin 4.2 3.2 - 4.9 g/dL    Total Protein 6.6 6.0 - 8.2 g/dL    Globulin 2.4 1.9 - 3.5 g/dL    A-G Ratio 1.8 g/dL   HCG QUAL SERUM   Result Value Ref Range    Beta-Hcg Qualitative Serum Negative Negative   CREATINE KINASE   Result Value Ref Range    CPK Total 93 0 - 154 U/L   ESTIMATED GFR   Result Value Ref Range    GFR If African American >60 >60 mL/min/1.73 m 2    GFR If Non African American >60 >60 mL/min/1.73 m 2     RADIOLOGY  CT-HEAD W/O   Final Result      No acute intracranial abnormality is identified.          COURSE & MEDICAL DECISION MAKING  Nursing notes and vital signs were reviewed. (See chart for details)  The patients records were reviewed, history was obtained from the patient;     1110 -called to bedside for active seizure.  Tonic-clonic activity, patient has apparently bit her tongue, blood to face and down.  Initially hypoxic, nonrebreather in place now.  Ativan 2 mg given IV.  CT had added for recurrent seizures today.    11:20 AM Dr. Caro, neurology, is aware of the  patient agreeable to consultation.  Request total 1.5 g Keppra load.    11:37 AM Dr. Hurt is where the patient agreeable to admission.    11:59 AM tongue evaluated again, persistent bilateral contusions, superficial erosion without hematoma or laceration.  No persistent bleeding.  No tongue swelling.  Tolerating secretions.    ED evaluation for recurrent seizures is unrevealing, suspect breakthrough secondary to discontinuation of Keppra 2 weeks ago.  Patient with 3 seizures reportedly tonic-clonic prior to arrival.  Flat affect, slow to speak but otherwise appropriate neurologically intact during my initial evaluation.  Patient with recurrent tonic-clonic seizure here in the ED, improved after Ativan.  Tongue contusion without laceration or swelling.  Airway intact.  Hemodynamically stable although slightly tachycardic.  IV fluid bolus for tachycardia, clinical dehydration initiated.  Keppra infusing.  Patient remains n.p.o. at this time.  Labs and CT are unremarkable.  She will be admitted to the hospitalist with close neurologic consultation for further evaluation and treatment.  Patient and her family are aware the findings and agreeable to the disposition plan.    FINAL IMPRESSION  (G40.909) Recurrent seizures (HCC)  (primary encounter diagnosis)  (S09.93XA) Injury of tongue, initial encounter      Electronically signed by: Leslie Peralta, 2/12/2019 11:20 AM      This dictation was created using voice recognition software. The accuracy of the dictation is limited to the abilities of the software. I expect there may be some errors of grammar and possibly content. The nursing notes were reviewed and certain aspects of this information were incorporated into this note.

## 2019-02-12 NOTE — ED NOTES
Pt reported to family at bedside she was not feeling well and anticipated another seizure, family member informed staff RN at bedside pt had full body seizure with some oral trauma she was suctioned pt on O2 non rebreather with O2 99% she was given 2mg ativan erp at bedside keppra IV started.

## 2019-02-12 NOTE — PROGRESS NOTES
1500: Assumed care of patient. Ambulated to bed from stretcher with x2 assist due to dizziness/weakness. Patient had N/V upon walking, likely due to ativan. Patient resting comfortably in bed on room air. All seizure precautions in place. Suction at bedside. Oriented patient to unit.

## 2019-02-12 NOTE — ED TRIAGE NOTES
Chief Complaint   Patient presents with   • Seizure     X3   pt BIB [facility] from home reported witnessed seizure X 3 with some oral trauma no active bleeding noted pt reports headache and back pain she denies injury. Recently taken off Keppra on ativan. Pt arrived to ED A&O.

## 2019-02-12 NOTE — PROGRESS NOTES
2 RN skin check completed with Lisa FUENTES.    Tattoos noted.  Piercing's noted along upper cheek bone.  No injury or breakdown noted.   Patient complaining of sores in mouth from seizures. No acute bleeding.

## 2019-02-12 NOTE — DISCHARGE PLANNING
Care Transition Team Assessment    Patient does not have an advance directive, booklet given.  Patient unable to answer question so information given by aunt.   Support system is her significant other and her family.  SO is Daryl Cook  [phone] and mother Gisella Pugh  [phone].  Aunt is also  Hina Lawson  [phone].  One of these three will be patient's ride home upon discharge.  Patient does own a medical marijuana card and last used marijuana on 2/11.  Scripts can be called in to the [facility] on [address].  No other needs appreciated at this time.      Information Source  Orientation : Disoriented to Time  Information Given By: Relative  Informant's Name: Hina Hanson  Who is responsible for making decisions for patient? : Patient    Elopement Risk  Legal Hold: No  Ambulatory or Self Mobile in Wheelchair: No-Not an Elopement Risk    Interdisciplinary Discharge Planning  Does Admitting Nurse Feel This Could be a Complex Discharge?: No  Lives with - Patient's Self Care Capacity: Significant Other  Support Systems: Spouse / Significant Other, Parent, Family Member(s)  Housing / Facility: 1 Story Apartment / Condo  Able to Return to Previous ADL's: Yes  Mobility Issues: No  Prior Services: None  Patient Expects to be Discharged to:: Home  Assistance Needed: Unknown at this Time    Discharge Preparedness  What is your plan after discharge?: Home with help  What are your discharge supports?: Partner, Parent  Prior Functional Level: Ambulatory  Difficulity with ADLs: None  Difficulity with IADLs: None    Functional Assesment  Prior Functional Level: Ambulatory    Finances  Financial Barriers to Discharge: No  Prescription Coverage: Yes    Advance Directive  Advance Directive?: None  Advance Directive offered?: AD Booklet given    Discharge Risks or Barriers  Discharge risks or barriers?: No PCP  Patient risk factors: No PCP    Anticipated Discharge Information  Anticipated discharge  disposition: Home  Discharge Address:  ([address] [place] [address]  [address]  [place])  Discharge Contact Phone Number:  ([phone])

## 2019-02-12 NOTE — CARE PLAN
Problem: Communication  Goal: The ability to communicate needs accurately and effectively will improve  Outcome: PROGRESSING AS EXPECTED      Problem: Safety  Goal: Will remain free from injury  Outcome: PROGRESSING AS EXPECTED    Goal: Will remain free from falls  Outcome: PROGRESSING AS EXPECTED      Problem: Infection  Goal: Will remain free from infection  Outcome: PROGRESSING AS EXPECTED      Problem: Venous Thromboembolism (VTW)/Deep Vein Thrombosis (DVT) Prevention:  Goal: Patient will participate in Venous Thrombosis (VTE)/Deep Vein Thrombosis (DVT)Prevention Measures  Outcome: PROGRESSING AS EXPECTED      Problem: Bowel/Gastric:  Goal: Normal bowel function is maintained or improved  Outcome: PROGRESSING AS EXPECTED    Goal: Will not experience complications related to bowel motility  Outcome: PROGRESSING AS EXPECTED      Problem: Knowledge Deficit  Goal: Knowledge of disease process/condition, treatment plan, diagnostic tests, and medications will improve  Outcome: PROGRESSING AS EXPECTED    Goal: Knowledge of the prescribed therapeutic regimen will improve  Outcome: PROGRESSING AS EXPECTED      Problem: Discharge Barriers/Planning  Goal: Patient's continuum of care needs will be met  Outcome: PROGRESSING AS EXPECTED      Problem: Pain Management  Goal: Pain level will decrease to patient's comfort goal  Outcome: PROGRESSING AS EXPECTED

## 2019-02-12 NOTE — H&P
Hospital Medicine History & Physical Note    Date of Service  2/12/2019    Primary Care Physician  Pcp Unknown    Consultants  Neurology    Code Status  Full    Chief Complaint  Seizures    History of Presenting Illness  28 y.o. female previously being treated for seizures who presented 2/12/2019 with recurrence of seizures after recently being taken off of her seizure medications approximately 2 weeks ago.  Patient currently is lethargic but has her aunt and boyfriend at bedside.  They state that the patient had a witnessed seizure this morning and had a recurrent seizure here in the emergency room as well.  Patient had been given Ativan.  Per reports the patient had recently been taken off of her Keppra.  Per reports the patient was only taking half her dose of Keppra at home.    Review of Systems  Review of Systems   Unable to perform ROS: Mental acuity       Past Medical History   has a past medical history of Psychiatric disorder (depression); Seizure (HCC); Seizure disorder (HCC); and Substance abuse (HCC).    Surgical History  None    Family History  Dad juan manuelwn, lives in [place]  Mom alive and well     Social History  Non smoker  2-3 beers per week  No kids    Allergies  Allergies   Allergen Reactions   • Pistachio Swelling     swelling       Medications  Prior to Admission Medications   Prescriptions Last Dose Informant Patient Reported? Taking?   LORazepam (ATIVAN) 1 MG Tab PRN at PRN Rx Bottle (For Med Information) No No   Sig: Take 1/2-1 tablet PO PRN breakthrough seizures.  Do not exceed more than 2 tablets in 24 hours unless directed otherwise by physician.   levETIRAcetam (KEPPRA) 500 MG Tab 2 WEEKS at D/C Rx Bottle (For Med Information) Yes Yes   Sig: Take 500 mg by mouth every bedtime.      Facility-Administered Medications: None       Physical Exam  Temp:  [36.7 °C (98 °F)-36.9 °C (98.5 °F)] 36.9 °C (98.5 °F)  Pulse:  [] 109  Resp:  [14-20] 20  BP: ()/(62-66) 117/62  SpO2:  [95 %-100 %]  96 %    Physical Exam   Constitutional: She appears well-developed. She appears lethargic. No distress.   HENT:   Head: Normocephalic and atraumatic.   Mouth/Throat: Oropharynx is clear and moist.   Mild tongue bite/laceration bilateral with minimal bruising and some dried blood noted around the corners of her mouth   Eyes: Conjunctivae and EOM are normal. Right eye exhibits no discharge. Left eye exhibits no discharge. No scleral icterus.   Neck: No tracheal deviation present.   Cardiovascular: Normal rate, regular rhythm and normal heart sounds.    No murmur heard.  Pulses:       Radial pulses are 2+ on the right side, and 2+ on the left side.        Dorsalis pedis pulses are 2+ on the right side, and 2+ on the left side.   Pulmonary/Chest: Effort normal. No stridor. No respiratory distress. She has no wheezes. She has no rales.   Abdominal: Soft. Bowel sounds are normal. She exhibits no distension. There is no tenderness. There is no rebound.   Musculoskeletal: She exhibits no edema.   Lymphadenopathy:     She has no cervical adenopathy.   Neurological: She appears lethargic. No cranial nerve deficit.   Skin: Skin is warm. She is not diaphoretic.   Psychiatric: She has a normal mood and affect. She is slowed. Cognition and memory are impaired.   Vitals reviewed.      Laboratory:  Recent Labs      02/12/19   1005   WBC  9.0   RBC  4.70   HEMOGLOBIN  13.8   HEMATOCRIT  41.4   MCV  88.1   MCH  29.4   MCHC  33.3*   RDW  40.3   PLATELETCT  188   MPV  10.6     Recent Labs      02/12/19   1005   SODIUM  137   POTASSIUM  4.1   CHLORIDE  110   CO2  20   GLUCOSE  97   BUN  8   CREATININE  0.57   CALCIUM  8.5     Recent Labs      02/12/19   1005   ALTSGPT  16   ASTSGOT  24   ALKPHOSPHAT  38   TBILIRUBIN  0.4   GLUCOSE  97                 No results for input(s): TROPONINI in the last 72 hours.    Urinalysis:    No results found     Imaging:  CT-HEAD W/O   Final Result      No acute intracranial abnormality is identified.             Assessment/Plan:  I anticipate this patient is appropriate for observation status at this time.    Right thyroid nodule- (present on admission)   Assessment & Plan    Per history  Checked TSH within normal limits  Recommend outpatient follow-up     Seizure disorder (HCC)- (present on admission)   Assessment & Plan    Keppra 500 mg twice daily  Neurology consulting  Seizure precautions  Check TSH, magnesium level  EEG per neurology  Ativan as needed for breakthrough seizures     Marijuana use- (present on admission)   Assessment & Plan    Recommend cessation         VTE prophylaxis: SCDs

## 2019-02-13 VITALS
DIASTOLIC BLOOD PRESSURE: 53 MMHG | RESPIRATION RATE: 16 BRPM | HEART RATE: 64 BPM | OXYGEN SATURATION: 98 % | SYSTOLIC BLOOD PRESSURE: 98 MMHG | WEIGHT: 147.71 LBS | TEMPERATURE: 98.1 F | HEIGHT: 64 IN | BODY MASS INDEX: 25.22 KG/M2

## 2019-02-13 LAB
AMPHET UR QL SCN: NEGATIVE
BARBITURATES UR QL SCN: NEGATIVE
BENZODIAZ UR QL SCN: NEGATIVE
BZE UR QL SCN: NEGATIVE
CANNABINOIDS UR QL SCN: POSITIVE
METHADONE UR QL SCN: NEGATIVE
OPIATES UR QL SCN: NEGATIVE
OXYCODONE UR QL SCN: NEGATIVE
PCP UR QL SCN: NEGATIVE
PROPOXYPH UR QL SCN: NEGATIVE

## 2019-02-13 PROCEDURE — 95951 EEG: CPT | Mod: 26,52 | Performed by: PSYCHIATRY & NEUROLOGY

## 2019-02-13 PROCEDURE — 99214 OFFICE O/P EST MOD 30 MIN: CPT | Performed by: PSYCHIATRY & NEUROLOGY

## 2019-02-13 PROCEDURE — 80307 DRUG TEST PRSMV CHEM ANLYZR: CPT

## 2019-02-13 PROCEDURE — 99217 PR OBSERVATION CARE DISCHARGE: CPT | Performed by: HOSPITALIST

## 2019-02-13 PROCEDURE — 95951 EEG: CPT | Mod: 52 | Performed by: PSYCHIATRY & NEUROLOGY

## 2019-02-13 PROCEDURE — A9270 NON-COVERED ITEM OR SERVICE: HCPCS | Performed by: HOSPITALIST

## 2019-02-13 PROCEDURE — 700102 HCHG RX REV CODE 250 W/ 637 OVERRIDE(OP): Performed by: HOSPITALIST

## 2019-02-13 PROCEDURE — G0378 HOSPITAL OBSERVATION PER HR: HCPCS

## 2019-02-13 RX ORDER — LEVETIRACETAM 500 MG/1
500 TABLET ORAL 2 TIMES DAILY
Qty: 60 TAB | Refills: 2 | Status: SHIPPED | OUTPATIENT
Start: 2019-02-13 | End: 2019-03-21 | Stop reason: SDUPTHER

## 2019-02-13 RX ADMIN — ACETAMINOPHEN 650 MG: 325 TABLET, FILM COATED ORAL at 01:22

## 2019-02-13 RX ADMIN — ACETAMINOPHEN 650 MG: 325 TABLET, FILM COATED ORAL at 15:07

## 2019-02-13 RX ADMIN — LEVETIRACETAM 500 MG: 500 TABLET ORAL at 06:13

## 2019-02-13 NOTE — PROCEDURES
EEG 02/13/19 4:05 PM    VIDEO ELECTROENCEPHALOGRAM / EPILEPSY MONITORING UNIT REPORT      Referring provider: DR. ESTES    DOS:   2/13/2019      INDICATION:  Joy Hanson 28 y.o. female presenting with seizures    CURRENT ANTIEPILEPTIC REGIMEN: Keppra     DURATION: 26 minutes      TECHNIQUE: A 30-channel video electroencephalogram (VEEG) was performed in accordance with the international 10-20 system. This digital study was reviewed in bipolar and referential montages. The recording examined the patient during wakeful, drowsy and sleep states.         DESCRIPTION OF THE RECORD:  During the awake state, background shows symmetrical 10-11 Hz alpha activity posteriorly with amplitude of 70 mV.  There was reactivity with eye opening/closure.  Normal anterior-posterior gradient was noted with faster beta frequencies seen anteriorly.  During drowsiness, high-amplitude delta frequencies were seen.    During the sleep state, background shows diffuse high-amplitude 4-5 Hz delta activity.  Symmetrical high-amplitude sleep spindles and vertex sharp activities were seen in the central leads.    ACTIVATION PROCEDURES:   Hyperventilation was performed by the patient. The technician performing the test noted good effort. This technique produced electroencephalographic changes in keeping with the expected bilaterally synchronous, frontally predominant, high amplitude slow waves build up.     Intermittent Photic stimulation was performed in a stepwise fashion from 1 to 30 Hz and elicited a normal response (photic driving), most noticeable in the posterior leads.      ICTAL AND/OR INTERICTAL FINDINGS:   No focal or generalized epileptiform activity was noted. No regional slowing was seen during this study.  No seizures were recorded during the study.     EVENT(S):  NONE    EKG: sampling review of EKG recording demonstrated irregular rhythm      INTERPRETATION:        ________________________________________________________________________    This is normal routine video EEG recording in the awake and drowsy/sleep state(s).    This scalp video EEG remains not remarkable    Of note, unremarkable EEG does not completely exclude the diagnosis  of seizures since seizure is an episodic phenomena and frontal lobe seizures could have normal scalp EEG. Clinical correlation may help     If clinical suspicion of seizure remains high.  Prolonged outpatient EEG   monitoring may be of help.    EEG 02/13/19 4:11 PM  ________________________________________________________________________

## 2019-02-13 NOTE — PROGRESS NOTES
No witnessed seizure activity over night. Pt medicated per MAR for nausea and headache. Ambulated to bathroom with SBA. Fall and seizure precautions in place.

## 2019-02-13 NOTE — PROGRESS NOTES
Patient awake, alert, and an active participant in bedside report.  Standby assist provided for ambulation to bathroom, patient performed hygiene and refused a shower, stating she will shower when she gets home.  Seizure precautions are in place, no seizure activity noted.  Hot tea provided on request, patient is resting in bed at the time of this note with call light in reach.

## 2019-02-13 NOTE — CONSULTS
"Hospital Neurology Consult:    Referring Physician: Dr Daryl Hurt. DO    Reason for consultation: seizures    HPI: Joy Hanson is a 28 y.o. female with history of childhood seizures and suspected seizure disorder presenting to the hospital for seizures and consulted for seizures.  The patient had reportedly been seizure-free for over a year.  She recently had discontinued her Keppra which was at a dose of 250 mg twice a day.  The patient states that this morning she had approximately 4 seizures.  They were witnessed by her boyfriend.  They were described as the patient holding her arms out in a flexed position over her elbow with her hands open and having contractions of her upper extremities characterized by extension and flexion of the elbows.  She was noted to have her head slumped over on the side and moving on a \"yes yes\" direction.  She was also noted to have pelvic thrusting and movement of her lower extremities which was in sync with the upper extremity movements.  They had a crescendo decrescendo quality and lasted approximately 3 minutes after which she was confused.  Confusion was short-lived however after the third event was lasting approximately 10 minutes.  She did not have any loss of sphincter control but had tongue biting over the right side of her tongue.  After arriving at the emergency department, she received Ativan and was loaded with 1.5 g of Keppra IV.  She is currently back to baseline.  Upon further questioning, there has been recent stress in her life namely bills as well as a close with passing away in the last few weeks.    ROS:     As above. All other systems reviewed and are negative.    Past Medical History:    has a past medical history of Psychiatric disorder (depression); Seizure (HCC); Seizure disorder (HCC); and Substance abuse (HCC).    FHx:  Family history of seizures on her father who passed away in a car accident while she was young.    SHx:   reports that she has never " smoked. She has never used smokeless tobacco. She reports that she drinks alcohol. She reports that she uses drugs, including Marijuana.    Allergies:  Allergies   Allergen Reactions   • Pistachio Swelling     swelling       Medications:    Current Facility-Administered Medications:   •  levETIRAcetam (KEPPRA) tablet 500 mg, 500 mg, Oral, BID, RADHA HolderO., 500 mg at 02/12/19 1602  •  senna-docusate (PERICOLACE or SENOKOT S) 8.6-50 MG per tablet 2 Tab, 2 Tab, Oral, BID **AND** polyethylene glycol/lytes (MIRALAX) PACKET 1 Packet, 1 Packet, Oral, QDAY PRN **AND** magnesium hydroxide (MILK OF MAGNESIA) suspension 30 mL, 30 mL, Oral, QDAY PRN **AND** bisacodyl (DULCOLAX) suppository 10 mg, 10 mg, Rectal, QDAY PRN, ADEBAYO Holder.O.  •  enoxaparin (LOVENOX) inj 40 mg, 40 mg, Subcutaneous, DAILY, ADEBAYO Holder.O., 40 mg at 02/12/19 1601  •  acetaminophen (TYLENOL) tablet 650 mg, 650 mg, Oral, Q6HRS PRN, RADHA HolderOMichell, 650 mg at 02/12/19 1602  •  ondansetron (ZOFRAN) syringe/vial injection 4 mg, 4 mg, Intravenous, Q4HRS PRN, RADHA HoledrO.  •  ondansetron (ZOFRAN ODT) dispertab 4 mg, 4 mg, Oral, Q4HRS PRN, ADEBAYO Holder.O.  •  promethazine (PHENERGAN) tablet 12.5-25 mg, 12.5-25 mg, Oral, Q4HRS PRN, RADHA HolderO.  •  promethazine (PHENERGAN) suppository 12.5-25 mg, 12.5-25 mg, Rectal, Q4HRS PRN, RADHA HolderO.  •  prochlorperazine (COMPAZINE) injection 5-10 mg, 5-10 mg, Intravenous, Q4HRS PRN, Daryl Hurt D.O.  •  LORazepam (ATIVAN) injection 4 mg, 4 mg, Intravenous, Q10 MIN PRN, Daryl Hurt D.O.    Vitals:   Vitals:    02/12/19 1230 02/12/19 1330 02/12/19 1436 02/12/19 1540   BP:    117/62   Pulse: 89 (!) 101 (!) 116 (!) 109   Resp:    20   Temp:    36.9 °C (98.5 °F)   TempSrc:    Temporal   SpO2: 99% 100% 99% 96%   Weight:       Height:           Labs:  Lab Results   Component Value Date/Time    PROTHROMBTM 13.2 05/05/2014 06:54 AM    INR 1.01 05/05/2014 06:54 AM       Lab Results   Component Value Date/Time    WBC 9.0 02/12/2019 10:05 AM    RBC 4.70 02/12/2019 10:05 AM    HEMOGLOBIN 13.8 02/12/2019 10:05 AM    HEMATOCRIT 41.4 02/12/2019 10:05 AM    MCV 88.1 02/12/2019 10:05 AM    MCH 29.4 02/12/2019 10:05 AM    MCHC 33.3 (L) 02/12/2019 10:05 AM    MPV 10.6 02/12/2019 10:05 AM    NEUTSPOLYS 73.10 (H) 02/12/2019 10:05 AM    LYMPHOCYTES 15.80 (L) 02/12/2019 10:05 AM    MONOCYTES 8.40 02/12/2019 10:05 AM    EOSINOPHILS 1.60 02/12/2019 10:05 AM    BASOPHILS 0.70 02/12/2019 10:05 AM    HYPOCHROMIA 1+ 06/19/2014 04:01 AM      Lab Results   Component Value Date/Time    SODIUM 137 02/12/2019 10:05 AM    POTASSIUM 4.1 02/12/2019 10:05 AM    CHLORIDE 110 02/12/2019 10:05 AM    CO2 20 02/12/2019 10:05 AM    GLUCOSE 97 02/12/2019 10:05 AM    BUN 8 02/12/2019 10:05 AM    CREATININE 0.57 02/12/2019 10:05 AM      Lab Results   Component Value Date/Time    CHOLSTRLTOT 200 (H) 04/28/2011 10:37 AM     04/28/2011 10:37 AM    HDL 47 04/28/2011 10:37 AM    TRIGLYCERIDE 92 04/28/2011 10:37 AM       Lab Results   Component Value Date/Time    ALKPHOSPHAT 38 02/12/2019 10:05 AM    ASTSGOT 24 02/12/2019 10:05 AM    ALTSGPT 16 02/12/2019 10:05 AM    TBILIRUBIN 0.4 02/12/2019 10:05 AM      No results found for: TSH  Lab Results   Component Value Date/Time    FREET4 1.10 07/23/2015 05:33 AM    FREET4 0.85 04/27/2011 04:25 PM         Imaging/Testing:  CT of the head without contrast on 2/12/2019 personally reviewed and was within normal limits.  No evidence of acute infarction or hemorrhage.    Physical Exam:     General: Well-appearing 28-year-old female in no acute distress.  Cardio: Normal S1/S2. No peripheral edema.   Pulm: CTAX2. No respiratory distress.   Skin: Warm, dry, no rashes or lesions   Psychiatric: Appropriate affect. No active psychosis.  HEENT: Atraumatic head, normal sclera and conjunctiva, moist oral mucosa. No lid lag.  There is a very superficial laceration on the right  tongue.  Abdomen: Soft, non tender. No masses or hepatosplenomegaly.    Neurologic:  Mental Status:  AAOx4. Able to follow commands/cross midline. Speech fluent/nondysarthric. Language functions intact. No neglect/apraxia.  Cranial Nerves:  PERRL. EOMi. Face symmetric, palate/tongue midline. Visual fields full to confrontation. Facial sensation intact.   Motor:  Normal muscle tone and bulk. Strength is 5/5 throughout. No abnormal movements.  Reflexes:  2/4 throughout. Flexor plantar responses bilaterally. Absent Tao's reflex.  Coordination:  Finger-nose without ataxia  Sensation:  Normal to light touch throughout  Gait/Station: Deferred    Assessment/Plan:    Joy Hanson is a 28 y.o. female with history of childhood seizures and suspected seizure disorder presenting to the hospital for seizures and consulted for seizures.  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  It was likely precipitated due to recent stressors in life.  She was suspected to also have pseudoseizures along with seizures.  However, this would be difficult to prove without having witnessed the event.  It is interesting however that these events are very hypermotor in nature and the patient's CK is within normal limits. In this regard, I agree with her remaining on Keppra at this time until she can get more solid follow-up as an outpatient.    Plan:  1.  Continue Keppra 500 mg twice a day  2.  Obtain EEG in the morning  3.  Pending on results of EEG, further recommendations will be provided.  4.  Obtain urine drug screen  5.  We will follow  6.  Plan discussed with consulting physician and patient's nurse.       Abdifatah Caro M.D., Diplomat of the American Board of Psychiatry and Neurology  [facility]

## 2019-02-13 NOTE — ASSESSMENT & PLAN NOTE
Keppra 500 mg twice daily  Neurology consulting  Seizure precautions  Check TSH, magnesium level  EEG per neurology  Ativan as needed for breakthrough seizures

## 2019-02-13 NOTE — CARE PLAN
Problem: Safety  Goal: Will remain free from injury  Outcome: PROGRESSING AS EXPECTED  Bed locked and in lowest position. Call light and personal belongings in reach. Bed alarm in place. Hourly rounding.     Problem: Pain Management  Goal: Pain level will decrease to patient's comfort goal  Outcome: PROGRESSING AS EXPECTED  Pt c/o mild headache, does not want to take medication at this time. Cold wash cloth provided. Will continue to assess.

## 2019-02-13 NOTE — PROGRESS NOTES
Hospital Neurology Progress Note:     Interval History: No acute events overnight.  No complaints today.  No further events overnight.    Objective:   Vitals:    02/12/19 2000 02/13/19 0000 02/13/19 0400 02/13/19 0800   BP: 102/65 108/61 104/60 (!) 98/58   Pulse: 97 85 80 65   Resp: 18 16 16 16   Temp: 37.3 °C (99.2 °F) 37.3 °C (99.2 °F) 37.2 °C (98.9 °F) 36.6 °C (97.8 °F)   TempSrc: Temporal Temporal Temporal Temporal   SpO2: 100% 100% 99% 97%   Weight:       Height:           Labs:     Lab Results   Component Value Date/Time    PROTHROMBTM 13.2 05/05/2014 06:54 AM    INR 1.01 05/05/2014 06:54 AM      Lab Results   Component Value Date/Time    WBC 9.0 02/12/2019 10:05 AM    RBC 4.70 02/12/2019 10:05 AM    HEMOGLOBIN 13.8 02/12/2019 10:05 AM    HEMATOCRIT 41.4 02/12/2019 10:05 AM    MCV 88.1 02/12/2019 10:05 AM    MCH 29.4 02/12/2019 10:05 AM    MCHC 33.3 (L) 02/12/2019 10:05 AM    MPV 10.6 02/12/2019 10:05 AM    NEUTSPOLYS 73.10 (H) 02/12/2019 10:05 AM    LYMPHOCYTES 15.80 (L) 02/12/2019 10:05 AM    MONOCYTES 8.40 02/12/2019 10:05 AM    EOSINOPHILS 1.60 02/12/2019 10:05 AM    BASOPHILS 0.70 02/12/2019 10:05 AM    HYPOCHROMIA 1+ 06/19/2014 04:01 AM      Lab Results   Component Value Date/Time    SODIUM 137 02/12/2019 10:05 AM    POTASSIUM 4.1 02/12/2019 10:05 AM    CHLORIDE 110 02/12/2019 10:05 AM    CO2 20 02/12/2019 10:05 AM    GLUCOSE 97 02/12/2019 10:05 AM    BUN 8 02/12/2019 10:05 AM    CREATININE 0.57 02/12/2019 10:05 AM      Lab Results   Component Value Date/Time    CHOLSTRLTOT 200 (H) 04/28/2011 10:37 AM     04/28/2011 10:37 AM    HDL 47 04/28/2011 10:37 AM    TRIGLYCERIDE 92 04/28/2011 10:37 AM       Lab Results   Component Value Date/Time    ALKPHOSPHAT 38 02/12/2019 10:05 AM    ASTSGOT 24 02/12/2019 10:05 AM    ALTSGPT 16 02/12/2019 10:05 AM    TBILIRUBIN 0.4 02/12/2019 10:05 AM        Imaging/Testing:   No new neuro imaging to review.    EEG performed on 2/13/2019 was personally reviewed and was  within normal limits.    Physical Exam:      General: Well-appearing 28-year-old female in no acute distress.  Cardio: Normal S1/S2. No peripheral edema.   Pulm: CTAX2. No respiratory distress.   Skin: Warm, dry, no rashes or lesions   Psychiatric: Appropriate affect. No active psychosis.  HEENT: Atraumatic head, normal sclera and conjunctiva, moist oral mucosa. No lid lag.  There is a very superficial laceration on the right tongue.  Abdomen: Soft, non tender. No masses or hepatosplenomegaly.     Neurologic:  Mental Status:  AAOx4. Able to follow commands/cross midline. Speech fluent/nondysarthric. Language functions intact. No neglect/apraxia.  Cranial Nerves:  PERRL. EOMi. Face symmetric, palate/tongue midline. Visual fields full to confrontation. Facial sensation intact.   Motor:  Normal muscle tone and bulk. Strength is 5/5 throughout. No abnormal movements.  Reflexes:  2/4 throughout. Flexor plantar responses bilaterally. Absent Tao's reflex.  Coordination:  Finger-nose without ataxia  Sensation:  Normal to light touch throughout  Gait/Station: Deferred    Patient examined on 2/13/2019.  Compared to physical exam on 2/12/2019 no significant clinical change was seen.    Assessment/Plan:    Joy Hanson is a 28 y.o. female with history of childhood seizures and suspected seizure disorder presenting to the hospital for seizures and consulted for seizures.  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  It was likely precipitated due to recent stressors in life.  She was suspected to also have pseudoseizures along with seizures.  However, this would be difficult to prove without having witnessed the event.    At this time, we will obtain an EEG to evaluate for interictal discharges.  He will remain on antiseizure drugs and follow-up as an outpatient.    Plan:  1.  Continue Keppra 500 mg twice daily  2.  EEG within normal limits  3.  Neurology  signing off for now.  Please call with any further questions or concerns.  4.  The patient will need follow-up in the outpatient neurology clinic with epilepsy  5.  Plan discussed with consulting physician and patient's nurse.    Abdifatah Caro M.D., Diplomat of the American Board of Psychiatry and Neurology  Diplomat of [facility]   Assistant Clinical Professor, [facility] Neurology Consultant

## 2019-02-14 NOTE — DISCHARGE SUMMARY
Hospital Medicine Discharge Note     Admit Date:  2/12/2019       Discharge Date:   2/13/2019    Attending Physician:  Eduin Whalen     Diagnoses (includes active and resolved):   Active Problems:    Seizure disorder (HCC) POA: Yes    Right thyroid nodule POA: Yes    Marijuana use POA: Yes  Resolved Problems:    * No resolved hospital problems. *      Hospital Summary (Brief Narrative):       28 y.o. female previously being treated for seizures who presented 2/12/2019 with recurrence of seizures after recently being taken off of her seizure medications approximately 2 weeks ago. Pt's aunt and boyfriend stated that the patient had a witnessed seizure this morning and had a recurrent seizure here in the emergency room as well.  Patient had been given Ativan.  Per reports the patient was only taking half her dose of Keppra at home.  Patient was admitted.  Neurology was consulted.  Patient had a EEG that was negative.  However this did not capture a seizure event.  It is possible that the patient may have a pseudoseizure, although with no event captured on EEG it is state this definitively.  However, in the meantime, patient is to continue on Keppra 500 twice daily.  Patient's mentation improved the next day although she says she is still sore.  Her CPK was unremarkable.  Patient will follow up with neurology outpatient in epilepsy clinic.        Consultants:      Neurologist Dr. Caro    Procedures:        EEG:  This is normal routine video EEG recording in the awake and drowsy/sleep state(s).  This scalp video EEG remains not remarkable    Discharge Medications:           Medication List      CHANGE how you take these medications      Instructions   levETIRAcetam 500 MG Tabs  What changed:  when to take this  Commonly known as:  KEPPRA   Take 1 Tab by mouth 2 Times a Day.  Dose:  500 mg        CONTINUE taking these medications      Instructions   LORazepam 1 MG Tabs  Commonly known as:  ATIVAN   Take 1/2-1 tablet PO  PRN breakthrough seizures.  Do not exceed more than 2 tablets in 24 hours unless directed otherwise by physician.          Disposition:   Discharge home    Activity:   As tolerated    Code status:   Full code    Primary Care Provider:    Pcp Unknown    Follow up appointment details :      No follow-up provider specified.  Future Appointments  Date Time Provider Department Center   3/21/2019 2:20 PM Christy Bejarano M.D. [facility] None   6/10/2019 9:40 AM [facility] None   6/25/2019 9:00 [facility]       Pending Studies:        None    Time spent on discharge day patient visit: 42 minutes    #################################################    Interval history/exam for day of discharge:    Vitals:    02/13/19 0400 02/13/19 0800 02/13/19 1200 02/13/19 1600   BP: 104/60 (!) 98/58 (!) 95/51 (!) 98/53   Pulse: 80 65 78 64   Resp: 16 16 16 16   Temp: 37.2 °C (98.9 °F) 36.6 °C (97.8 °F) 36.7 °C (98 °F) 36.7 °C (98.1 °F)   TempSrc: Temporal Temporal Temporal Temporal   SpO2: 99% 97% 96% 98%   Weight:       Height:         Weight/BMI: Body mass index is 25.35 kg/m².  Pulse Oximetry: 98 %, O2 (LPM): 0, O2 Delivery: None (Room Air)    General:  NAD  CVS:  RRR  PULM:  CTAB, no respiratory distress    Most Recent Labs:    Lab Results   Component Value Date/Time    WBC 9.0 02/12/2019 10:05 AM    RBC 4.70 02/12/2019 10:05 AM    HEMOGLOBIN 13.8 02/12/2019 10:05 AM    HEMATOCRIT 41.4 02/12/2019 10:05 AM    MCV 88.1 02/12/2019 10:05 AM    MCH 29.4 02/12/2019 10:05 AM    MCHC 33.3 (L) 02/12/2019 10:05 AM    MPV 10.6 02/12/2019 10:05 AM    NEUTSPOLYS 73.10 (H) 02/12/2019 10:05 AM    LYMPHOCYTES 15.80 (L) 02/12/2019 10:05 AM    MONOCYTES 8.40 02/12/2019 10:05 AM    EOSINOPHILS 1.60 02/12/2019 10:05 AM    BASOPHILS 0.70 02/12/2019 10:05 AM    HYPOCHROMIA 1+ 06/19/2014 04:01 AM      Lab Results   Component Value Date/Time    SODIUM 137 02/12/2019 10:05 AM    POTASSIUM 4.1 02/12/2019 10:05 AM     CHLORIDE 110 02/12/2019 10:05 AM    CO2 20 02/12/2019 10:05 AM    GLUCOSE 97 02/12/2019 10:05 AM    BUN 8 02/12/2019 10:05 AM    CREATININE 0.57 02/12/2019 10:05 AM      Lab Results   Component Value Date/Time    ALTSGPT 16 02/12/2019 10:05 AM    ASTSGOT 24 02/12/2019 10:05 AM    ALKPHOSPHAT 38 02/12/2019 10:05 AM    TBILIRUBIN 0.4 02/12/2019 10:05 AM    LIPASE 20 06/08/2017 10:40 AM    ALBUMIN 4.2 02/12/2019 10:05 AM    GLOBULIN 2.4 02/12/2019 10:05 AM    INR 1.01 05/05/2014 06:54 AM     Lab Results   Component Value Date/Time    PROTHROMBTM 13.2 05/05/2014 06:54 AM    INR 1.01 05/05/2014 06:54 AM        Imaging/ Testing:      CT-HEAD W/O   Final Result      No acute intracranial abnormality is identified.          Instructions:      The patient was instructed to return to the ER in the event of worsening symptoms. I have counseled the patient on the importance of compliance and the patient has agreed to proceed with all medical recommendations and follow up plan indicated above.   The patient understands that all medications come with benefits and risks. Risks may include permanent injury or death and these risks can be minimized with close reassessment and monitoring.

## 2019-02-14 NOTE — PROGRESS NOTES
Patient discharged to home via car, accompanied by significant other.  Discharge education provided regarding, medications, appointments, activity, and when to call the doctor.  Patient verbalized understanding of home care instructions and expressed satisfaction with care provided.

## 2019-02-14 NOTE — DISCHARGE INSTRUCTIONS
Discharge Instructions    Discharged to home by car with relative. Discharged via wheelchair, hospital escort: Yes.  Special equipment needed: Not Applicable    Be sure to schedule a follow-up appointment with your primary care doctor or any specialists as instructed.     Discharge Plan:   Influenza Vaccine Indication: Patient Refuses    I understand that a diet low in cholesterol, fat, and sodium is recommended for good health. Unless I have been given specific instructions below for another diet, I accept this instruction as my diet prescription.   Other diet: regular    Special Instructions: None    · Is patient discharged on Warfarin / Coumadin?   No     Depression / Suicide Risk    As you are discharged from this [facility] facility, it is important to learn how to keep safe from harming yourself.    Recognize the warning signs:  · Abrupt changes in personality, positive or negative- including increase in energy   · Giving away possessions  · Change in eating patterns- significant weight changes-  positive or negative  · Change in sleeping patterns- unable to sleep or sleeping all the time   · Unwillingness or inability to communicate  · Depression  · Unusual sadness, discouragement and loneliness  · Talk of wanting to die  · Neglect of personal appearance   · Rebelliousness- reckless behavior  · Withdrawal from people/activities they love  · Confusion- inability to concentrate     If you or a loved one observes any of these behaviors or has concerns about self-harm, here's what you can do:  · Talk about it- your feelings and reasons for harming yourself  · Remove any means that you might use to hurt yourself (examples: pills, rope, extension cords, firearm)  · Get professional help from the community (Mental Health, Substance Abuse, psychological counseling)  · Do not be alone:Call your Safe Contact- someone whom you trust who will be there for you.  · Call your local CRISIS HOTLINE 791-8742 or  247-581-4987  · Call your local Children's Mobile Crisis Response Team [place] (553) 014-2513 or www.[email or web address]  · Call the toll free National Suicide Prevention Hotlines   · National Suicide Prevention Lifeline 419-391-FFGA (9580)  · National [employer] Network 800-SUICIDE (674-9791)

## 2019-03-21 ENCOUNTER — OFFICE VISIT (OUTPATIENT)
Dept: INTERNAL MEDICINE | Facility: MEDICAL CENTER | Age: 29
End: 2019-03-21
Payer: MEDICAID

## 2019-03-21 VITALS
HEIGHT: 64 IN | TEMPERATURE: 98.7 F | DIASTOLIC BLOOD PRESSURE: 66 MMHG | BODY MASS INDEX: 25.44 KG/M2 | WEIGHT: 149 LBS | HEART RATE: 65 BPM | OXYGEN SATURATION: 96 % | SYSTOLIC BLOOD PRESSURE: 101 MMHG

## 2019-03-21 DIAGNOSIS — E04.1 RIGHT THYROID NODULE: ICD-10-CM

## 2019-03-21 DIAGNOSIS — Z23 NEED FOR DTAP VACCINE: ICD-10-CM

## 2019-03-21 DIAGNOSIS — G40.909 SEIZURE DISORDER (HCC): ICD-10-CM

## 2019-03-21 PROCEDURE — 99204 OFFICE O/P NEW MOD 45 MIN: CPT | Mod: GC,25 | Performed by: INTERNAL MEDICINE

## 2019-03-21 PROCEDURE — 90715 TDAP VACCINE 7 YRS/> IM: CPT | Performed by: INTERNAL MEDICINE

## 2019-03-21 PROCEDURE — 90471 IMMUNIZATION ADMIN: CPT | Performed by: INTERNAL MEDICINE

## 2019-03-21 RX ORDER — LEVETIRACETAM 500 MG/1
250 TABLET ORAL 2 TIMES DAILY
Qty: 30 TAB | Refills: 3 | Status: SHIPPED | OUTPATIENT
Start: 2019-03-21 | End: 2019-06-03 | Stop reason: SDUPTHER

## 2019-03-21 ASSESSMENT — ENCOUNTER SYMPTOMS
CONSTITUTIONAL NEGATIVE: 1
NEUROLOGICAL NEGATIVE: 1
MUSCULOSKELETAL NEGATIVE: 1
GASTROINTESTINAL NEGATIVE: 1
RESPIRATORY NEGATIVE: 1
EYES NEGATIVE: 1
CARDIOVASCULAR NEGATIVE: 1
PSYCHIATRIC NEGATIVE: 1

## 2019-03-21 NOTE — PROGRESS NOTES
New Patient to Establish    Reason to establish: New patient to establish    CC: Establish care and prescription refill    HPI: This is a 28 y.o.-yo-old patient with PMH of seizure and  presented today for prescription refill.  History of seizures since early childhood, about 1 years old, persisted until age 5.  Around the age of 18, she was going through depressants and stress which caused her seizures to return.  She has a history of GTC seizures mostly occurring during sleep time, between 8:54 AM or right before she starts to bleed or when the stress level is high.  Currently she is taking Keppra 250 mg twice daily however would like to challenge a lower dose of 250 once a day.  Most recent seizure episode was on  and was admitted to the hospital at which time EEG was unremarkable.  She was advised to take Keppra 500 mg twice daily however she is feeling dizzy and nauseous when taking high-dose and prefers to take to 250 mg twice daily.    Patient Active Problem List    Diagnosis Date Noted   • Right thyroid nodule 2015     Priority: High   • Seizure disorder (HCC) 2015     Priority: High   • Noncompliance with medication regimen 2014     Priority: High   • Status epilepticus (HCC) 2011     Priority: High   • Marijuana use 2014     Priority: Medium   • Hypomagnesemia 2014     Priority: Medium   • Hypokalemia 2011     Priority: Medium   • Leukocytosis 2011     Priority: Medium   • Hypotension (arterial) 2016       Past Medical History:   Diagnosis Date   • Psychiatric disorder depression   • Seizure (HCC)    • Seizure disorder (HCC)     Dx as a child. on Keppra 250 mg once a day.   • Substance abuse (HCC)     marijuana use 3 x a week       Current Outpatient Prescriptions   Medication Sig Dispense Refill   • levETIRAcetam (KEPPRA) 500 MG Tab Take 0.5 Tabs by mouth 2 times a day. 30 Tab 3     No current facility-administered medications for this visit.   "      Allergies as of 03/21/2019 - Reviewed 03/21/2019   Allergen Reaction Noted   • Pistachio Swelling 06/08/2017       Social History     Social History   • Marital status: Single     Spouse name: N/A   • Number of children: N/A   • Years of education: N/A     Occupational History   • Not on file.     Social History Main Topics   • Smoking status: Never Smoker   • Smokeless tobacco: Never Used   • Alcohol use Yes      Comment: occa   • Drug use: Yes     Types: Marijuana      Comment: pt states uses marijuana 3 times a week to help with nausea    • Sexual activity: Yes     Partners: Male      Comment: Unplanned pregnancy. pt states was not on birth control.     Other Topics Concern   • Not on file     Social History Narrative    ** Merged History Encounter **            Family History   Problem Relation Age of Onset   • Seizures Father        History reviewed. No pertinent surgical history.    Review of Systems:     Review of Systems   Constitutional: Negative.    HENT: Negative.    Eyes: Negative.    Respiratory: Negative.    Cardiovascular: Negative.    Gastrointestinal: Negative.    Genitourinary: Negative.    Musculoskeletal: Negative.    Skin: Negative.    Neurological: Negative.    Endo/Heme/Allergies: Negative.    Psychiatric/Behavioral: Negative.    All other systems reviewed and are negative.      Physical Exam  /66 (BP Location: Left arm, Patient Position: Sitting, BP Cuff Size: Adult)   Pulse 65   Temp 37.1 °C (98.7 °F) (Temporal)   Ht 1.626 m (5' 4\")   Wt 67.6 kg (149 lb)   LMP 09/27/2018   SpO2 96%   BMI 25.58 kg/m²   Body mass index is 25.58 kg/m².  Physical Exam   Constitutional: She is oriented to person, place, and time and well-developed, well-nourished, and in no distress. No distress.   HENT:   Head: Normocephalic and atraumatic.   Eyes: Pupils are equal, round, and reactive to light. EOM are normal.   Neck: Normal range of motion. Neck supple.   Cardiovascular: Normal rate, regular " rhythm and normal heart sounds.    No murmur heard.  Pulmonary/Chest: Effort normal and breath sounds normal. No respiratory distress. She has no wheezes.   Abdominal: Soft. Bowel sounds are normal. She exhibits no distension. There is no tenderness.   Musculoskeletal: Normal range of motion. She exhibits no edema or tenderness.   Neurological: She is alert and oriented to person, place, and time.   Skin: Skin is warm and dry.   Vitals reviewed.    Joy Hanson's recent labs and medications list were reviewed.  Assessment and Plan    1. Seizure disorder (HCC)  -She was advised regarding family planning and avoid menstrual cycle if possible due to significant hormonal correlation with a neurologist.  Also advised her not to drive, work skydiving, unsupervised swimming.  She is already aware of all side effects of Keppra which also includes suicidal ideation.  She was advised to stay away from alcohol which can lower seizure threshold.  She is using marijuana recreationally.  -She has an appointment with a neurologist on Karen 3 and 25.  - levETIRAcetam (KEPPRA) 500 MG Tab; Take 0.5 Tabs by mouth 2 times a day.  Dispense: 30 Tab; Refill: 3    2. Right thyroid nodule  -Has a history of thyroid nodule, biopsy of which showed benign follicular nodule.  She denied noticing any symptoms related to thyroid.  Most recent TSH was unremarkable in February 2019.    3. Need for DTaP vaccine  - Tdap =>6yo IM    Preventive care  Received flu shot couple months ago, received Tdap in the clinic today, Pap smear was performed 2 years ago.    Joy Hanson expressed understanding of this plan and agreed to the above mentioned plan.    Followup: Return in about 6 months (around 9/21/2019), or if symptoms worsen or fail to improve, for Short.    Signed by: Christy Bejarano M.D.

## 2019-03-21 NOTE — NON-PROVIDER
"Joy Hanson is a 28 y.o. female here for a non-provider visit for:   TDAP    Reason for immunization: Overdue/Provider Recommended  Immunization records indicate need for vaccine: Yes, confirmed with Epic  Minimum interval has been met for this vaccine: Yes  ABN completed: Not Indicated    Order and dose verified by: Lashanda   VIS Dated  2/24/15 was given to patient: Yes  All IAC Questionnaire questions were answered \"No.\"    Patient tolerated injection and no adverse effects were observed or reported: Yes    Pt scheduled for next dose in series: Not Indicated    "

## 2019-06-03 ENCOUNTER — TELEPHONE (OUTPATIENT)
Dept: NEUROLOGY | Facility: MEDICAL CENTER | Age: 29
End: 2019-06-03

## 2019-06-03 ENCOUNTER — OFFICE VISIT (OUTPATIENT)
Dept: NEUROLOGY | Facility: MEDICAL CENTER | Age: 29
End: 2019-06-03
Payer: MEDICAID

## 2019-06-03 VITALS
HEIGHT: 64 IN | HEART RATE: 75 BPM | RESPIRATION RATE: 16 BRPM | DIASTOLIC BLOOD PRESSURE: 64 MMHG | OXYGEN SATURATION: 98 % | WEIGHT: 151 LBS | BODY MASS INDEX: 25.78 KG/M2 | SYSTOLIC BLOOD PRESSURE: 118 MMHG

## 2019-06-03 DIAGNOSIS — G40.909 SEIZURE DISORDER (HCC): ICD-10-CM

## 2019-06-03 PROCEDURE — 99213 OFFICE O/P EST LOW 20 MIN: CPT | Performed by: NURSE PRACTITIONER

## 2019-06-03 RX ORDER — LEVETIRACETAM 500 MG/1
250 TABLET ORAL 2 TIMES DAILY
Qty: 30 TAB | Refills: 5 | Status: SHIPPED | OUTPATIENT
Start: 2019-06-03 | End: 2020-02-26

## 2019-06-03 ASSESSMENT — ENCOUNTER SYMPTOMS
COUGH: 1
HEADACHES: 0
VOMITING: 0
DEPRESSION: 0
CONSTITUTIONAL NEGATIVE: 1
SORE THROAT: 0
DIARRHEA: 0
NAUSEA: 0
SEIZURES: 1
ABDOMINAL PAIN: 0
NERVOUS/ANXIOUS: 0
MUSCULOSKELETAL NEGATIVE: 1
DOUBLE VISION: 0

## 2019-06-03 NOTE — TELEPHONE ENCOUNTER
Please cancel the upcoming EEG on June 25th-- there is no need for this as an EEG was done in February.

## 2019-06-03 NOTE — PROGRESS NOTES
"Subjective:      Joy Hanson is a 28 y.o. female who presents with Follow-Up (Seizure disorder)            HPI     April 23rd missed her medication and was throwing up.  She had a seizure and was given ativan and went to bed.  Possibly had an event associated with vomiting?  Unable to recall if she fully lost consciousness.    February 12, 2019 suspected seizure at home.  REEG was normal.    Has been taking Keppra 1/2 tablet, 250mg one time per day.  BID-- She'd like to challenge a lower dose.  Does not like taking pills in general.     Trialed: Depakote, Dilantin, Lamictal    Does not have a 's license at this time.    2017: Head CT, normal.  2014: Brain MRI, normal.    2/2019 EEG normal.    Current Outpatient Prescriptions   Medication Sig Dispense Refill   • levETIRAcetam (KEPPRA) 500 MG Tab Take 0.5 Tabs by mouth 2 times a day. 30 Tab 3     No current facility-administered medications for this visit.        Review of Systems   Constitutional: Negative.    HENT: Positive for congestion. Negative for hearing loss, nosebleeds and sore throat.         No recent head injury.   Eyes: Negative for double vision.        No new loss of vision.   Respiratory: Positive for cough.         No recent lung infections.   Cardiovascular: Negative for chest pain.   Gastrointestinal: Negative for abdominal pain, diarrhea, nausea and vomiting.   Genitourinary: Negative.    Musculoskeletal: Negative.    Skin: Negative.    Neurological: Positive for seizures. Negative for headaches.   Endo/Heme/Allergies:        No history of endocrine dysfunction.  No new problems.   Psychiatric/Behavioral: Negative for depression. The patient is not nervous/anxious.         No recent mood changes.          Objective:     /64 (BP Location: Left arm, Patient Position: Sitting, BP Cuff Size: Adult)   Pulse 75   Resp 16   Ht 1.626 m (5' 4.02\")   Wt 68.5 kg (151 lb)   LMP 09/27/2018   SpO2 98%   BMI 25.91 kg/m²      Physical Exam "   Constitutional: She is oriented to person, place, and time. She appears well-developed and well-nourished. No distress.   HENT:   Head: Normocephalic and atraumatic.   Eyes: Pupils are equal, round, and reactive to light.   Neck: Normal range of motion.   Cardiovascular: Normal rate and regular rhythm.    Pulmonary/Chest: Effort normal.   Neurological: She is alert and oriented to person, place, and time. She exhibits normal muscle tone. Gait normal.   No observable changes in neurologic status.  See initial new patient examination for details.    Skin: Skin is warm.   Psychiatric: She has a normal mood and affect.               Assessment/Plan:     Seizure disorder:  History of events suggestive of seizures since age 1.   Concern for a seizure on April 23rd attributed to GI illness and missing a dose of Keppra.     2/2019 EEG normal.    Will increase the Leviteracetam from 250mg daily to 250mg BID.  Counseled that this is an exceptionally low dose of Keppra.    If there is further concern for seizures I will be compelled to order a 24-hour AEEG next.    Return for follow-up in 5-6 months.        EDUCATION AND COUNSELING:  -Education was provided to the patient and/or family regarding diagnosis and prognosis. The chronic and unpredictable nature of the condition was discussed. There is increased risk for additional events, which may carry potential for significant injuries and death.    -We reviewed the current antiepileptic regimen. Potential side effects of antiepileptics were discussed at length, including but no limited to: hypersensitivity reactions (rash and others, some of which can be fatal), visual field changes (some of which may be irreversible), glaucoma, diplopia, kidney stones, osteopenia/osteoporosis/bone fractures, hyperthermia/anhydrosis, tremors, ataxia, dizziness, fatigue, increased risk for falls, cardiac arrhythmias/syncope, gastrointestinal (hepatitis, pancreatitis, gastritis, ulcers),  gingival hypertrophy, drowsiness, sedation, anxiety/nervousness, increased risk for suicide, increased risk for depression, and psychosis. We reviewed drug-drug interactions and their potential effect on seizure control and medication side effects.    -Patient/family educated on SUDEP (Sudden Death in Epilepsy). Counseling was provided on the importance of strict medication and follow up compliance. The patient understands the risks associated with non-adherence with the medical plan as outlined, including but not limited to an increased risk for breakthrough seizures, which may contribute to injuries, disability, status epilepticus, and even death.    -Counseling was also provided on potential effects of alcohol and other drugs, which may lower seizure threshold and/or affect the metabolism of antiepileptic drugs. I recommend avoidance of alcohol and illegal drugs.  -Recommend proper hydration, regular exercise, proper sleep hygiene (7-8 hrs of overnight sleep, avoid sleep deprivation).    -I have made the patient aware of mandatory reporting required by the law in the State of [place] regarding episodes of seizures, loss of consciousness, and/or alteration of awareness. The patient and family are responsible for reporting events to the DMV, instructions were provided. The patient verbalized understanding and states she has not been driving. Other seizure precautions were discussed at length, including no diving, no skydiving, no unsupervised swimming, no Jacuzzi or bathing in bathtubs.      Pt agrees with plan.

## 2019-11-11 ENCOUNTER — OFFICE VISIT (OUTPATIENT)
Dept: NEUROLOGY | Facility: MEDICAL CENTER | Age: 29
End: 2019-11-11
Payer: MEDICAID

## 2019-11-11 VITALS
WEIGHT: 155 LBS | BODY MASS INDEX: 26.46 KG/M2 | HEIGHT: 64 IN | HEART RATE: 70 BPM | SYSTOLIC BLOOD PRESSURE: 110 MMHG | DIASTOLIC BLOOD PRESSURE: 60 MMHG

## 2019-11-11 DIAGNOSIS — G40.909 SEIZURE DISORDER (HCC): ICD-10-CM

## 2019-11-11 PROCEDURE — 99213 OFFICE O/P EST LOW 20 MIN: CPT | Performed by: NURSE PRACTITIONER

## 2019-11-11 ASSESSMENT — ENCOUNTER SYMPTOMS
DOUBLE VISION: 0
DEPRESSION: 0
SORE THROAT: 0
CONSTITUTIONAL NEGATIVE: 1
NAUSEA: 0
MUSCULOSKELETAL NEGATIVE: 1
VOMITING: 0
ABDOMINAL PAIN: 0
HEADACHES: 0
DIARRHEA: 0
NERVOUS/ANXIOUS: 0
COUGH: 0

## 2019-11-11 NOTE — PROGRESS NOTES
"Subjective:      Joy Hanson is a 29 y.o. female who presents with Follow-Up (Seizures)    Presents with a male  today.      HPI  Has been doing very well.    Reports having a \"big grand mal\" one time per month before her menses begins.  This has not happened in many months now.  Unable to report more about her events.  Denies aura, has loss of consciousness.  Does not expand on typical time of day either.    Trialed: Depakote Dilantin Lamictal     Does not have a 's license at this time.     2017: Head CT, normal.  2014: Brain MRI, normal.     2/2019 EEG normal.    She is taking Keprpa 500mg each night.    Current Outpatient Medications   Medication Sig Dispense Refill   • levETIRAcetam (KEPPRA) 500 MG Tab Take 0.5 Tabs by mouth 2 times a day. (Patient taking differently: Take 500 mg by mouth every day.) 30 Tab 5     No current facility-administered medications for this visit.        Review of Systems   Constitutional: Negative.    HENT: Negative for hearing loss, nosebleeds and sore throat.         No recent head injury.   Eyes: Negative for double vision.        No new loss of vision.   Respiratory: Negative for cough.         No recent lung infections.   Cardiovascular: Negative for chest pain.   Gastrointestinal: Negative for abdominal pain, diarrhea, nausea and vomiting.   Genitourinary: Negative.    Musculoskeletal: Negative.    Skin: Negative.    Neurological: Positive for seizures. Negative for headaches.   Endo/Heme/Allergies:        No history of endocrine dysfunction.  No new problems.   Psychiatric/Behavioral: Negative for depression. The patient is not nervous/anxious.         No recent mood changes.          Objective:     /60 (BP Location: Left arm, Patient Position: Sitting, BP Cuff Size: Adult)   Pulse 70   Ht 1.626 m (5' 4\")   Wt 70.3 kg (155 lb)   BMI 26.61 kg/m²      Physical Exam  Constitutional:       Appearance: She is well-developed.   HENT:      Head: " Normocephalic and atraumatic.      Nose: Nose normal.   Eyes:      Pupils: Pupils are equal, round, and reactive to light.   Neck:      Musculoskeletal: Normal range of motion.   Cardiovascular:      Rate and Rhythm: Normal rate and regular rhythm.   Pulmonary:      Effort: Pulmonary effort is normal.   Musculoskeletal: Normal range of motion.   Skin:     General: Skin is warm.   Neurological:      Mental Status: She is alert and oriented to person, place, and time.      Motor: No abnormal muscle tone.      Gait: Gait normal.      Comments: No observable changes in neurologic status.  See initial new patient examination for details.    Psychiatric:         Mood and Affect: Affect is flat.                 Assessment/Plan:     Seizure disorder:  History of events suggestive of seizures since age 1.   Concern for a seizure on April 23rd attributed to GI illness and missing a dose of Keppra.  Reports GTC seizures before her menses.     2/2019 EEG normal.     Counseled regarding the dosing of Keppra.  She is currently taking 500mg of regular release each night.  Not only is this a very low dose but not recommended. Asked to take Keppra 250mg BID or we can use a new Keppra XR 500mg tablet for dosing.     She does not wish to perform a 24-hour AEEG at this time.  Reviewed that this will provide more data to assist with diagnosis.     Will start a daily MVI.    We she is running out of her 500mg tablets of Keppra, will call for an updated Rx for 250mg tablets Rx taking one tablet BID.    Return for follow-up in 6 months or sooner if needed.         EDUCATION AND COUNSELING:  -Education was provided to the patient and/or family regarding diagnosis and prognosis. The chronic and unpredictable nature of the condition was discussed. There is increased risk for additional events, which may carry potential for significant injuries and death.    -We reviewed the current antiepileptic regimen. Potential side effects of  antiepileptics were discussed at length, including but no limited to: hypersensitivity reactions (rash and others, some of which can be fatal), visual field changes (some of which may be irreversible), glaucoma, diplopia, kidney stones, osteopenia/osteoporosis/bone fractures, hyperthermia/anhydrosis, tremors, ataxia, dizziness, fatigue, increased risk for falls, cardiac arrhythmias/syncope, gastrointestinal (hepatitis, pancreatitis, gastritis, ulcers), gingival hypertrophy, drowsiness, sedation, anxiety/nervousness, increased risk for suicide, increased risk for depression, and psychosis. We reviewed drug-drug interactions and their potential effect on seizure control and medication side effects.    -Patient/family educated on SUDEP (Sudden Death in Epilepsy). Counseling was provided on the importance of strict medication and follow up compliance. The patient understands the risks associated with non-adherence with the medical plan as outlined, including but not limited to an increased risk for breakthrough seizures, which may contribute to injuries, disability, status epilepticus, and even death.    -Counseling was also provided on potential effects of alcohol and other drugs, which may lower seizure threshold and/or affect the metabolism of antiepileptic drugs. I recommend avoidance of alcohol and illegal drugs.  -Recommend proper hydration, regular exercise, proper sleep hygiene (7-8 hrs of overnight sleep, avoid sleep deprivation).    -I have made the patient aware of mandatory reporting required by the law in the [place] regarding episodes of seizures, loss of consciousness, and/or alteration of awareness. The patient and family are responsible for reporting events to the DMV, instructions were provided. The patient verbalized understanding and states she has not been driving. Other seizure precautions were discussed at length, including no diving, no skydiving, no unsupervised swimming, no Jacuzzi or  bathing in bathtubs.       Pt agrees with plan.

## 2019-11-12 ASSESSMENT — ENCOUNTER SYMPTOMS: SEIZURES: 1

## 2020-06-29 ENCOUNTER — TELEPHONE (OUTPATIENT)
Dept: NEUROLOGY | Facility: MEDICAL CENTER | Age: 30
End: 2020-06-29

## 2020-07-06 ENCOUNTER — TELEPHONE (OUTPATIENT)
Dept: NEUROLOGY | Facility: MEDICAL CENTER | Age: 30
End: 2020-07-06

## 2020-07-06 NOTE — TELEPHONE ENCOUNTER
Patient called 07/02 and LVM asking for refill on medication. Keppra was sent to pharmacy on 06/29/2020.    Called patient and apologized for delay in response but I was out on Thursday 07/02 and office closed on Friday 07/03, advised refill was sent on 06/29/200 to [facility] on [address].

## 2020-09-16 ENCOUNTER — OFFICE VISIT (OUTPATIENT)
Dept: NEUROLOGY | Facility: MEDICAL CENTER | Age: 30
End: 2020-09-16
Payer: MEDICAID

## 2020-09-16 VITALS
HEIGHT: 64 IN | TEMPERATURE: 97.8 F | WEIGHT: 145.5 LBS | HEART RATE: 74 BPM | BODY MASS INDEX: 24.84 KG/M2 | DIASTOLIC BLOOD PRESSURE: 74 MMHG | SYSTOLIC BLOOD PRESSURE: 112 MMHG | OXYGEN SATURATION: 98 %

## 2020-09-16 DIAGNOSIS — Z91.148 NONCOMPLIANCE WITH MEDICATION REGIMEN: ICD-10-CM

## 2020-09-16 DIAGNOSIS — G40.909 SEIZURE DISORDER (HCC): ICD-10-CM

## 2020-09-16 PROCEDURE — 99213 OFFICE O/P EST LOW 20 MIN: CPT | Performed by: NURSE PRACTITIONER

## 2020-09-16 RX ORDER — LEVETIRACETAM 250 MG/1
250 TABLET ORAL 2 TIMES DAILY
Qty: 180 TAB | Refills: 3 | Status: SHIPPED | OUTPATIENT
Start: 2020-09-16 | End: 2021-09-22

## 2020-09-16 RX ORDER — LEVETIRACETAM 500 MG/1
TABLET ORAL
Qty: 30 TAB | Refills: 11 | Status: SHIPPED | OUTPATIENT
Start: 2020-09-16 | End: 2020-09-16

## 2020-09-16 ASSESSMENT — ENCOUNTER SYMPTOMS
SEIZURES: 0
DIARRHEA: 0
DEPRESSION: 0
MUSCULOSKELETAL NEGATIVE: 1
CONSTITUTIONAL NEGATIVE: 1
COUGH: 0
DOUBLE VISION: 0
ABDOMINAL PAIN: 0
HEADACHES: 0
NERVOUS/ANXIOUS: 0
NAUSEA: 0
SORE THROAT: 0
VOMITING: 0

## 2020-09-16 ASSESSMENT — FIBROSIS 4 INDEX: FIB4 SCORE: 1.02

## 2020-09-16 ASSESSMENT — PATIENT HEALTH QUESTIONNAIRE - PHQ9: CLINICAL INTERPRETATION OF PHQ2 SCORE: 0

## 2020-09-16 NOTE — PROGRESS NOTES
"Subjective:      Joy Hanson is a 30 y.o. female who presents with Follow-Up (seizure disorder)        Presents with a male  today.      HPI  Has been doing very well.     No known episodes of seizures.  Denies aura, has loss of consciousness.  Does not expand on typical time of day either.     Trialed: Depakote, Dilantin, Lamictal     Does not have a 's license at this time.     2017: Head CT, normal.  2014: Brain MRI, normal.     2/2019 EEG normal.     She is taking Keppra 500mg each night.     Current Outpatient Medications   Medication Sig Dispense Refill   • levETIRAcetam (KEPPRA) 500 MG Tab TAKE 1/2 TABLET BY MOUTH TWICE DAILY 20 Tab 0     No current facility-administered medications for this visit.        Review of Systems   Constitutional: Negative.    HENT: Negative for hearing loss, nosebleeds and sore throat.         No recent head injury.   Eyes: Negative for double vision.        No new loss of vision.   Respiratory: Negative for cough.         No recent lung infections.   Cardiovascular: Negative for chest pain.   Gastrointestinal: Negative for abdominal pain, diarrhea, nausea and vomiting.   Genitourinary: Negative.    Musculoskeletal: Negative.    Skin: Negative.    Neurological: Negative for seizures and headaches.   Endo/Heme/Allergies:        No history of endocrine dysfunction.  No new problems.   Psychiatric/Behavioral: Negative for depression. The patient is not nervous/anxious.         No recent mood changes.          Objective:     /74 (BP Location: Left arm, Patient Position: Sitting, BP Cuff Size: Adult)   Pulse 74   Temp 36.6 °C (97.8 °F) (Temporal)   Ht 1.626 m (5' 4\")   Wt 66 kg (145 lb 8.1 oz)   SpO2 98%   BMI 24.98 kg/m²      Physical Exam  Constitutional:       Appearance: She is well-developed.   HENT:      Head: Normocephalic and atraumatic.      Mouth/Throat:      Mouth: Mucous membranes are moist.   Eyes:      Pupils: Pupils are equal, round, and " reactive to light.   Neck:      Musculoskeletal: Normal range of motion.   Cardiovascular:      Rate and Rhythm: Normal rate and regular rhythm.   Pulmonary:      Effort: Pulmonary effort is normal.   Musculoskeletal: Normal range of motion.   Skin:     General: Skin is warm.   Neurological:      Mental Status: She is alert and oriented to person, place, and time.      Motor: No abnormal muscle tone.      Gait: Gait normal.      Comments: No observable changes in neurologic status.  See initial new patient examination for details.    Psychiatric:         Mood and Affect: Affect is flat.           Assessment/Plan:         Seizure disorder:  History of events suggestive of seizures since age 1.   Concern for a seizure on April 23, 2019 attributed to GI illness and missing a dose of Keppra.  Reports GTC seizures before her menses years ago.     2/2019 EEG normal.     Counseled regarding the dosing of Keppra.  She is currently taking 500mg of regular release each night.  Not only is this a very low dose but not recommended. Asked to take Keppra 250mg BID or we can use a new Keppra XR 500mg tablet for dosing.     She does not wish to perform a 24-hour AEEG at this time.  Reviewed that this will provide more data to assist with diagnosis.     Will start a daily MVI.    New prescription started for Keppra 250mg BID.    Return for follow-up in one year.      EDUCATION AND COUNSELING:  -Education was provided to the patient and/or family regarding diagnosis and prognosis. The chronic and unpredictable nature of the condition was discussed. There is increased risk for additional events, which may carry potential for significant injuries and death.    -We reviewed the current antiepileptic regimen. Potential side effects of antiepileptics were discussed at length, including but no limited to: hypersensitivity reactions (rash and others, some of which can be fatal), visual field changes (some of which may be irreversible),  glaucoma, diplopia, kidney stones, osteopenia/osteoporosis/bone fractures, hyperthermia/anhydrosis, tremors, ataxia, dizziness, fatigue, increased risk for falls, cardiac arrhythmias/syncope, gastrointestinal (hepatitis, pancreatitis, gastritis, ulcers), gingival hypertrophy, drowsiness, sedation, anxiety/nervousness, increased risk for suicide, increased risk for depression, and psychosis. We reviewed drug-drug interactions and their potential effect on seizure control and medication side effects.    -Patient/family educated on SUDEP (Sudden Death in Epilepsy). Counseling was provided on the importance of strict medication and follow up compliance. The patient understands the risks associated with non-adherence with the medical plan as outlined, including but not limited to an increased risk for breakthrough seizures, which may contribute to injuries, disability, status epilepticus, and even death.    -Counseling was also provided on potential effects of alcohol and other drugs, which may lower seizure threshold and/or affect the metabolism of antiepileptic drugs. I recommend avoidance of alcohol and illegal drugs.  -Recommend proper hydration, regular exercise, proper sleep hygiene (7-8 hrs of overnight sleep, avoid sleep deprivation).    -I have made the patient aware of mandatory reporting required by the law in the [place] regarding episodes of seizures, loss of consciousness, and/or alteration of awareness. The patient and family are responsible for reporting events to the DMV, instructions were provided. The patient verbalized understanding and states she has not been driving. Other seizure precautions were discussed at length, including no diving, no skydiving, no unsupervised swimming, no Jacuzzi or bathing in bathtubs.       Pt agrees with plan.

## 2021-09-22 DIAGNOSIS — G40.909 SEIZURE DISORDER (HCC): ICD-10-CM

## 2021-09-22 RX ORDER — LEVETIRACETAM 250 MG/1
TABLET ORAL
Qty: 180 TABLET | Refills: 3 | Status: SHIPPED | OUTPATIENT
Start: 2021-09-22

## 2021-09-22 NOTE — TELEPHONE ENCOUNTER
Received request via: Pharmacy    Was the patient seen in the last year in this department? Yes    Does the patient have an active prescription (recently filled or refills available) for medication(s) requested? Yes.   [facility] pt

## 2022-04-22 ENCOUNTER — APPOINTMENT (OUTPATIENT)
Dept: NEUROLOGY | Facility: MEDICAL CENTER | Age: 32
End: 2022-04-22
Attending: STUDENT IN AN ORGANIZED HEALTH CARE EDUCATION/TRAINING PROGRAM
Payer: MEDICAID